# Patient Record
Sex: MALE | Race: WHITE | Employment: OTHER | URBAN - METROPOLITAN AREA
[De-identification: names, ages, dates, MRNs, and addresses within clinical notes are randomized per-mention and may not be internally consistent; named-entity substitution may affect disease eponyms.]

---

## 2017-01-09 ENCOUNTER — OFFICE VISIT (OUTPATIENT)
Dept: DERMATOLOGY | Facility: AMBULATORY SURGERY CENTER | Age: 62
End: 2017-01-09

## 2017-01-09 VITALS
TEMPERATURE: 98.4 F | WEIGHT: 240 LBS | RESPIRATION RATE: 18 BRPM | HEART RATE: 78 BPM | HEIGHT: 73 IN | BODY MASS INDEX: 31.81 KG/M2 | DIASTOLIC BLOOD PRESSURE: 84 MMHG | OXYGEN SATURATION: 97 % | SYSTOLIC BLOOD PRESSURE: 120 MMHG

## 2017-01-09 DIAGNOSIS — Z80.8 FAMILY HISTORY OF NONMELANOMA SKIN CANCER: ICD-10-CM

## 2017-01-09 DIAGNOSIS — L57.0 ACTINIC KERATOSIS: Primary | ICD-10-CM

## 2017-01-09 DIAGNOSIS — L82.0 INFLAMED SEBORRHEIC KERATOSIS: ICD-10-CM

## 2017-01-09 DIAGNOSIS — D18.01 CHERRY ANGIOMA: ICD-10-CM

## 2017-01-09 DIAGNOSIS — D22.9 MULTIPLE BENIGN NEVI: ICD-10-CM

## 2017-01-09 DIAGNOSIS — L82.1 SEBORRHEIC KERATOSES: ICD-10-CM

## 2017-01-09 NOTE — MR AVS SNAPSHOT
Visit Information Date & Time Provider Department Dept. Phone Encounter #  
 1/9/2017  9:00 AM MARIA LUZ Gallagher 8057 [phone] [id number] Your Appointments 1/9/2017  9:00 AM  
Office Visit with MARIA LUZ Gallagher 8057 [facility]) Appt Note: EST PT; 6 MO SKIN EXAM H/O ak  
 [facility] [address] [facility] [address]  
[address] [place] [address] [address] [place] [address] [address] [facility] [address] Upcoming Health Maintenance Date Due Hepatitis C Screening 1955 DTaP/Tdap/Td series (1 - Tdap) 3/23/1975 FOBT Q 1 YEAR AGE 50-75 3/23/2004 ZOSTER VACCINE AGE 60> 3/23/2014 INFLUENZA AGE 9 TO ADULT 8/1/2016 Allergies as of 1/9/2017  Review Complete On: 1/9/2017 By: Jason Palafox NP No Known Allergies Current Immunizations  Never Reviewed No immunizations on file. Not reviewed this visit Vitals BP Pulse Temp Resp Height(growth percentile) Weight(growth percentile) 120/84 (BP 1 Location: Right arm, BP Patient Position: Sitting) 78 98.4 °F (36.9 °C) (Oral) 18 6' 1\" (1.854 m) 240 lb (108.9 kg) SpO2 BMI Smoking Status 97% 31.66 kg/m2 Never Smoker Vitals History BMI and BSA Data Body Mass Index Body Surface Area  
 31.66 kg/m 2 2.37 m 2 Preferred Pharmacy Pharmacy Name Phone [facility] # [address], [address] [facility] [phone] Your Updated Medication List  
  
   
This list is accurate as of: 1/9/17  8:54 AM.  Always use your most recent med list.  
  
  
  
  
 aspirin delayed-release 81 mg tablet Take  by mouth daily. cholecalciferol (VITAMIN D3) 5,000 unit Tab tablet Commonly known as:  VITAMIN D3 Take  by mouth daily. COQ10  100-100 mg-unit Cap Generic drug:  coenzyme q10-vitamin e Take  by mouth.   
  
 CORAL CALCIUM  
 by Does Not Apply route. LANTUS SOLOSTAR 100 unit/mL (3 mL) pen Generic drug:  insulin glargine 60 Units by SubCUTAneous route. LIPITOR 20 mg tablet Generic drug:  atorvastatin Take 10 mg by mouth daily. * metFORMIN 500 mg tablet Commonly known as:  GLUCOPHAGE Take 1,500 mg by mouth two (2) times daily (with meals). * metFORMIN 1,000 mg tablet Commonly known as:  GLUCOPHAGE Take 1,000 mg by mouth nightly. multivitamin tablet Commonly known as:  ONE A DAY Take 1 Tab by mouth daily. Omega-3-DHA-EPA-Fish Oil 1,000 mg (120 mg-180 mg) Cap Take  by mouth. OTHER  
PQQ  
  
 * Notice: This list has 2 medication(s) that are the same as other medications prescribed for you. Read the directions carefully, and ask your doctor or other care provider to review them with you. Introducing [facility] HEALTH SERVICES! Noemy Brenner introduces [employer]here[employer] patient portal. Now you can access parts of your medical record, email your doctor's office, and request medication refills online. 1. In your internet browser, go to [email or web address] 2. Click on the First Time User? Click Here link in the Sign In box. You will see the New Member Sign Up page. 3. Enter your AdhereTech Access Code exactly as it appears below. You will not need to use this code after youve completed the sign-up process. If you do not sign up before the expiration date, you must request a new code. · Adhere[employer] Access Code: [email or web address] Expires: 4/9/2017  8:54 AM 
 
4. Enter the last four digits of your Social Security Number (xxxx) and Date of Birth (mm/dd/yyyy) as indicated and click Submit. You will be taken to the next sign-up page. 5. Create a HowStuffWorkst ID. This will be your AdhereTech login ID and cannot be changed, so think of one that is secure and easy to remember. 6. Create a AdhereTech password. You can change your password at any time. 7. Enter your Password Reset Question and Answer. This can be used at a later time if you forget your password. 8. Enter your e-mail address. You will receive e-mail notification when new information is available in [address] 9. Click Sign Up. You can now view and download portions of your medical record. 10. Click the Download Summary menu link to download a portable copy of your medical information. If you have questions, please visit the Frequently Asked Questions section of the [employer] website. Remember, [employer] is NOT to be used for urgent needs. For medical emergencies, dial 911. Now available from your iPhone and Android! Please provide this summary of care documentation to your next provider. If you have any questions after today's visit, please call [phone].

## 2017-01-09 NOTE — PROGRESS NOTES
Name: Azeem Ibanez       Age: 57 y.o. Date: 1/9/2017    Chief Complaint:   Chief Complaint   Patient presents with    Skin Exam       Subjective:    HPI  Mr. Azeem Ibanez is a 57 y.o. male who presents for a full skin exam.  The patient's last skin exam was 6 months ago and the patient does have current complaints related to his skin. He is aware of scaly lesions on the face and skin tag on the left ear. Mr. Azeem Ibanez is feeling well and in his usual state of health today. The patient's pertinent skin history includes : AK, Family history of melanoma in his father    ROS: Constitutional: Negative. Dermatological : positive for - skin lesion changes      Social History     Social History    Marital status:      Spouse name: N/A    Number of children: N/A    Years of education: N/A     Occupational History    Not on file. Social History Main Topics    Smoking status: Never Smoker    Smokeless tobacco: Never Used    Alcohol use No    Drug use: No    Sexual activity: Not on file     Other Topics Concern    Not on file     Social History Narrative       Family History   Problem Relation Age of Onset    Cancer Mother     Emphysema Mother     Cancer Father        Past Medical History   Diagnosis Date    Endocrine disease      Diabetes    Family history of skin cancer      Melanoma in his father    History of actinic keratoses     Sun-damaged skin     Sunburn, blistering        Past Surgical History   Procedure Laterality Date    Hx hip replacement       Left side       Current Outpatient Prescriptions   Medication Sig Dispense Refill    OTHER PQQ      multivitamin (ONE A DAY) tablet Take 1 Tab by mouth daily.  Omega-3-DHA-EPA-Fish Oil 1,000 mg (120 mg-180 mg) cap Take  by mouth.  atorvastatin (LIPITOR) 20 mg tablet Take 10 mg by mouth daily.  aspirin delayed-release 81 mg tablet Take  by mouth daily.       metFORMIN (GLUCOPHAGE) 500 mg tablet Take 1,500 mg by mouth two (2) times daily (with meals).  coenzyme q10-vitamin e (COQ10 ) 100-100 mg-unit cap Take  by mouth.  cholecalciferol, VITAMIN D3, (VITAMIN D3) 5,000 unit tab tablet Take  by mouth daily.  insulin glargine (LANTUS SOLOSTAR) 100 unit/mL (3 mL) pen 60 Units by SubCUTAneous route.  metFORMIN (GLUCOPHAGE) 1,000 mg tablet Take 1,000 mg by mouth nightly.  CALCIUM CARBONATE (CORAL CALCIUM) by Does Not Apply route. No Known Allergies      Objective:    Visit Vitals    /84 (BP 1 Location: Right arm, BP Patient Position: Sitting)    Pulse 78    Temp 98.4 °F (36.9 °C) (Oral)    Resp 18    Ht 6' 1\" (1.854 m)    Wt 108.9 kg (240 lb)    SpO2 97%    BMI 31.66 kg/m2       Elva Thompson is a 57 y.o. male who appears well and in no distress. He is awake, alert, and oriented. There is no preauricular, submandibular, or cervical lymphadenopathy. A skin examination was performed including his scalp, face (including eyelids), ears, neck, chest, back, abdomen, upper extremities (including digits/nails), lower extremities, breasts, buttocks; genital skin was not examined. There are thin scaled AKs on the scalp, face. There are two small skin tags on the left ear (one on the superior aspect and one on the posterior). There are scattered seborrheic keratoses and cherry angiomas. Two inflamed SKs are noted on the face. There are medium brown junctional nevi and pink intradermal nevi without concerning features. I did image one on the left chest that is medium brown broken up pigment but reassuring otherwise. Assessment/Plan:    1. Normal nevi. The diagnosis of normal nevi was reviewed. I discussed sun protection, sunscreen use, the warning signs of skin cancer, the need for self-skin examinations, and the need for regular practitioner exams every 9 months.   The patient should follow up sooner as needed if new, changing, or symptomatic skin lesions arise.  2. Seborrheic keratoses. The diagnosis was reviewed and the patient was reassured that no treatment is needed for these benign lesions. 3. Family history of skin cancer. I discussed sun protection, sunscreen use, the warning signs of skin cancer, the need for self-skin examinations, and the need for regular practitioner exams every 9 months. The patient should follow up sooner as needed if new, changing, or symptomatic skin lesions arise. 4. Cherry angiomas. The diagnosis was reviewed and the patient was reassured that no treatment is needed for these benign lesions. 5. Inflamed seborrheic keratoses. The diagnosis and treatment with liquid nitrogen cryotherapy were reviewed. The risk or persistence or recurrence of the keratosis and the potential for pigment change at the treated site were reviewed. Verbal consent was obtained. I treated 2 lesions with cryotherapy and care was reviewed. 6. Actinic Keratoses. The diagnosis of this precancerous lesion related to sun exposure was reviewed. Verbal consent was obtained. I treated 6 lesions with cryotherapy and post-cryotherapy care was reviewed. [facility]   OFFICE PROCEDURE PROGRESS NOTE   Chart reviewed for the following:   Saeid ESPINAL, [address], have reviewed the History, Physical and updated the Allergic reactions for [place]. TIME OUT performed immediately prior to start of procedure:   Kylie ESPINAL, have performed the following reviews on [place]   prior to the start of the procedure:     * Patient was identified by name and date of birth   * Agreement on procedure being performed was verified   * Risks and Benefits explained to the patient   * Procedure site verified and marked as necessary   * Patient was positioned for comfort   * Verbal consent was obtained    Time: 0915  Date of procedure: 1/9/2017  Procedure performed by: Reny Busby.  Abhi Louis  Provider assisted by: none   Patient assisted by: self   How tolerated by patient: tolerated the procedure well with no complications   Comments: none

## 2017-10-09 ENCOUNTER — OFFICE VISIT (OUTPATIENT)
Dept: DERMATOLOGY | Facility: AMBULATORY SURGERY CENTER | Age: 62
End: 2017-10-09

## 2017-10-09 VITALS
BODY MASS INDEX: 31.81 KG/M2 | TEMPERATURE: 98.3 F | HEIGHT: 73 IN | SYSTOLIC BLOOD PRESSURE: 140 MMHG | DIASTOLIC BLOOD PRESSURE: 60 MMHG | HEART RATE: 91 BPM | WEIGHT: 240 LBS | RESPIRATION RATE: 18 BRPM | OXYGEN SATURATION: 97 %

## 2017-10-09 DIAGNOSIS — L82.1 OTHER SEBORRHEIC KERATOSIS: ICD-10-CM

## 2017-10-09 DIAGNOSIS — D69.2 PIGMENTED PURPURA (HCC): ICD-10-CM

## 2017-10-09 DIAGNOSIS — L57.0 ACTINIC KERATOSIS: Primary | ICD-10-CM

## 2017-10-09 DIAGNOSIS — L90.5 SCAR CONDITION AND FIBROSIS OF SKIN: ICD-10-CM

## 2017-10-09 DIAGNOSIS — T14.8XXA EXCORIATION: ICD-10-CM

## 2017-10-09 DIAGNOSIS — L81.4 LENTIGINES: ICD-10-CM

## 2017-10-09 DIAGNOSIS — Z80.8 FAMILY HISTORY OF MELANOMA: ICD-10-CM

## 2017-10-09 DIAGNOSIS — D22.9 MULTIPLE BENIGN NEVI: ICD-10-CM

## 2017-10-09 DIAGNOSIS — D18.01 CHERRY ANGIOMA: ICD-10-CM

## 2017-10-09 DIAGNOSIS — L82.0 INFLAMED SEBORRHEIC KERATOSIS: ICD-10-CM

## 2017-10-09 NOTE — PROGRESS NOTES
Chief Complaint   Patient presents with    Skin Exam     1. Have you been to the ER, urgent care clinic since your last visit? Hospitalized since your last visit? No    2. Have you seen or consulted any other health care providers outside of the [address] [place] [address] since your last visit? Include any pap smears or colon screening. Yes, Dr Pastor Vizcaino.

## 2017-10-09 NOTE — PROGRESS NOTES
Written by Dariana Agosto, as dictated by Leila Goddard, [address]. Name: Chuyita Guerrero       Age: 60 y.o. Date: 10/9/2017    Chief Complaint:   Chief Complaint   Patient presents with    Skin Exam       Subjective:    HPI  Mr. Chuyita Guerrero is a 60 y.o. male who presents for a full skin exam.  The patient's last skin exam was on 1/9/17 and the patient does have current complaints related to his skin. He reports crusty lesions on his forehead to scalp. He hits these with his comb. The patient's pertinent skin history includes : AKs; family history of melanoma skin cancer in his father     ROS: Constitutional: Negative. Dermatological : positive for - skin lesion changes      Social History     Social History    Marital status:      Spouse name: N/A    Number of children: N/A    Years of education: N/A     Occupational History    Not on file. Social History Main Topics    Smoking status: Never Smoker    Smokeless tobacco: Never Used    Alcohol use No    Drug use: No    Sexual activity: Not on file     Other Topics Concern    Not on file     Social History Narrative       Family History   Problem Relation Age of Onset    Cancer Mother     Emphysema Mother     Cancer Father        Past Medical History:   Diagnosis Date    Endocrine disease     Diabetes    Family history of skin cancer     Melanoma in his father    History of actinic keratoses     Sun-damaged skin     Sunburn, blistering        Past Surgical History:   Procedure Laterality Date    HX HIP REPLACEMENT      Left side       Current Outpatient Prescriptions   Medication Sig Dispense Refill    OTHER PQQ      multivitamin (ONE A DAY) tablet Take 1 Tab by mouth daily.  Omega-3-DHA-EPA-Fish Oil 1,000 mg (120 mg-180 mg) cap Take  by mouth.  atorvastatin (LIPITOR) 20 mg tablet Take 10 mg by mouth daily.  aspirin delayed-release 81 mg tablet Take  by mouth daily.       metFORMIN (GLUCOPHAGE) 500 mg tablet Take 1,500 mg by mouth two (2) times daily (with meals).  metFORMIN (GLUCOPHAGE) 1,000 mg tablet Take 1,000 mg by mouth nightly.  CALCIUM CARBONATE (CORAL CALCIUM) by Does Not Apply route.  coenzyme q10-vitamin e (COQ10 ) 100-100 mg-unit cap Take  by mouth.  cholecalciferol, VITAMIN D3, (VITAMIN D3) 5,000 unit tab tablet Take  by mouth daily.  insulin glargine (LANTUS SOLOSTAR) 100 unit/mL (3 mL) pen 60 Units by SubCUTAneous route. No Known Allergies      Objective:    Visit Vitals    /60 (BP 1 Location: Left arm, BP Patient Position: Sitting)    Pulse 91    Temp 98.3 °F (36.8 °C) (Oral)    Resp 18    Ht 6' 1\" (1.854 m)    Wt 108.9 kg (240 lb)    SpO2 97%    BMI 31.66 kg/m2       Remi Mix is a 60 y.o. male who appears well and in no distress. He is awake, alert, and oriented. There is no preauricular, submandibular, or cervical lymphadenopathy. A skin examination was performed including his scalp, face (including eyelids), ears, neck, chest, back, abdomen, upper extremities (including digits/nails), lower extremities, breasts, buttocks; genital skin was not examined. He has a well healed scar on his upper back and two on the mid/ lower back. He has pink intradermal nevi and brown junctional nevi, no concerning features. He has scattered waxy macules and keratotic papules consistent with seborrheic keratoses. He has inflamed seborrheic keratoses at his temporal hairline on the left. He has scattered red papules consistent with cherry angiomas. He has lentigines on sun exposed areas. He has a 9 x 6 mm medium brown pigmented and irregularly shaped macule, consistent with junctional nevus on his mid chest, no apparent change from prior photo. He has a scaly lesion consistent with an actinic keratosis on his right cheek. Assessment/Plan:    1. Normal nevi. The diagnosis of normal nevi was reviewed.   I discussed sun protection, sunscreen use, the warning signs of skin cancer, the need for self-skin examinations, and the need for regular practitioner exams every 9 months. The patient should follow up sooner as needed if new, changing, or symptomatic skin lesions arise. 2. Seborrheic keratoses. The diagnosis was reviewed and the patient was reassured that no treatment is needed for these benign lesions. 3. Inflamed seborrheic keratoses. The diagnosis was reviewed. Verbal consent was obtained. I treated 5 lesions with cryotherapy and post-cryotherapy care was reviewed. 4. Cherry angiomas. The diagnosis was reviewed and the patient was reassured that no treatment is needed for these benign lesions. 5. Solar lentigos. The diagnosis and relationship to sun exposure was reviewed. Sun protection advised. 6. Neoplasm of Uncertain Behavior, mid chest.  The differential diagnoses were discussed. We will continue to monitor this neoplasm of uncertain behavior on his chest for changes. 7. Actinic Keratosis, right cheek. The diagnosis of this precancerous lesion related to sun exposure was reviewed. Verbal consent was obtained. I treated 1 lesion with cryotherapy and post-cryotherapy care was reviewed. 8. Family history of skin cancer. I discussed sun protection, sunscreen use, the warning signs of skin cancer, the need for self-skin examinations, and the need for regular practitioner exams every 9 months. The patient should follow up sooner as needed if new, changing, or symptomatic skin lesions arise. This plan was reviewed with the patient and patient agrees. All questions were answered. This scribe documentation was reviewed by me and accurately reflects the examination and decisions made by me. [facility]   OFFICE PROCEDURE PROGRESS NOTE   Chart reviewed for the following:   I, Saeid Armendariz, have reviewed the History, Physical and updated the Allergic reactions for Esteban Trace. TIME OUT performed immediately prior to start of procedure:   Kylie ESPINAL, have performed the following reviews on Esteban Trace   prior to the start of the procedure:     * Patient was identified by name and date of birth   * Agreement on procedure being performed was verified   * Risks and Benefits explained to the patient   * Procedure site verified and marked as necessary   * Patient was positioned for comfort   * Verbal consent was obtained    Time: 9:22 AM  Date of procedure: 10/9/2017  Procedure performed by: Angelito Mcqueen.  Americo Reese DNP  Provider assisted by: self  Patient assisted by: self   How tolerated by patient: tolerated the procedure well with no complications   Comments: none

## 2017-10-09 NOTE — MR AVS SNAPSHOT
Visit Information Date & Time Provider Department Dept. Phone Encounter #  
 10/9/2017  9:00 AM Juwan PengMARIA LUZ 8057 [phone] [id number] Upcoming Health Maintenance Date Due Hepatitis C Screening 1955 DTaP/Tdap/Td series (1 - Tdap) 3/23/1975 FOBT Q 1 YEAR AGE 50-75 3/23/2004 ZOSTER VACCINE AGE 60> 1/23/2014 INFLUENZA AGE 9 TO ADULT 8/1/2017 Allergies as of 10/9/2017  Review Complete On: 10/9/2017 By: Helen Castro No Known Allergies Current Immunizations  Never Reviewed No immunizations on file. Not reviewed this visit Vitals BP Pulse Temp Resp Height(growth percentile) Weight(growth percentile) 140/60 (BP 1 Location: Left arm, BP Patient Position: Sitting) 91 98.3 °F (36.8 °C) (Oral) 18 6' 1\" (1.854 m) 240 lb (108.9 kg) SpO2 BMI Smoking Status 97% 31.66 kg/m2 Never Smoker BMI and BSA Data Body Mass Index Body Surface Area  
 31.66 kg/m 2 2.37 m 2 Preferred Pharmacy Pharmacy Name Phone [place] [phone] Your Updated Medication List  
  
   
This list is accurate as of: 10/9/17  9:02 AM.  Always use your most recent med list.  
  
  
  
  
 aspirin delayed-release 81 mg tablet Take  by mouth daily. cholecalciferol (VITAMIN D3) 5,000 unit Tab tablet Commonly known as:  VITAMIN D3 Take  by mouth daily. COQ10  100-100 mg-unit Cap Generic drug:  coenzyme q10-vitamin e Take  by mouth. CORAL CALCIUM  
by Does Not Apply route. LANTUS SOLOSTAR 100 unit/mL (3 mL) Inpn Generic drug:  insulin glargine 60 Units by SubCUTAneous route. LIPITOR 20 mg tablet Generic drug:  atorvastatin Take 10 mg by mouth daily. * metFORMIN 500 mg tablet Commonly known as:  GLUCOPHAGE Take 1,500 mg by mouth two (2) times daily (with meals). * metFORMIN 1,000 mg tablet Commonly known as:  GLUCOPHAGE Take 1,000 mg by mouth nightly. multivitamin tablet Commonly known as:  ONE A DAY Take 1 Tab by mouth daily. Omega-3-DHA-EPA-Fish Oil 1,000 mg (120 mg-180 mg) Cap Take  by mouth. OTHER  
PQQ  
  
 * Notice: This list has 2 medication(s) that are the same as other medications prescribed for you. Read the directions carefully, and ask your doctor or other care provider to review them with you. Patient Instructions Self Skin Exam and Sunscreens Early detection and treatment is essential in the treatment of all forms of skin cancer. If caught early, all forms of skin cancer are curable. In addition to your regular visits, you should perform a monthly skin examination. Over time, you become familiar with what is normally found on your skin and can identify new or suspicious spots. One of the screening tools you can use to assess your skin is to follow the ABCDEs: 
 
A= Asymmetry (One half is unlike the other half) B= Border (An irregular, scalloped or poorly defined edge) C= Color (Is varied from one area to another, has shades of tan, brown/ black,       white, red or blue) D= Diameter (Spots larger than 6mm or a pencil eraser) E= Evolving (New spots or one that is changing in size, shape, or color) A follow- up interval will be customized based on your history of skin cancer or level of skin damage and risk factors. In any case, if you notice a suspicious or new spot, an appointment should be arranged between regular visits. Everyone should use sunscreen and sun-safe practices, which is especially important for those with a personal or family history of skin cancer. Suggestions for this include: 1. Use daily moisturizers containing SPF 30 or higher. 2. Wear long sleeve clothing with UPF ratings and a broad-brimmed hat. 3. Apply sunscreen with SPF 30 or higher to all sun exposed areas if you are going to be in the sun. A broad spectrum UVA/ UVB sunscreen is best.  Dont forget to REAPPLY every two hours or more often if swimming or sweating! 4. Avoid outside activities during peak sun hours, especially in the summer (10am- 2pm). 5. DO NOT use tanning beds. Using sunscreen and sun-safe practices can help reduce the likelihood of developing skin cancer or additional skin cancers in those previously diagnosed. Introducing [facility] & [facility]! Romayne Duster introduces [employer] patient portal. Now you can access parts of your medical record, email your doctor's office, and request medication refills online. 1. In your internet browser, go to [email or web address] 2. Click on the First Time User? Click Here link in the Sign In box. You will see the New Member Sign Up page. 3. Enter your Netsize Access Code exactly as it appears below. You will not need to use this code after youve completed the sign-up process. If you do not sign up before the expiration date, you must request a new code. · Nets[employer] Access Code: [email or web address] Expires: 1/7/2018  9:02 AM 
 
4. Enter the last four digits of your Social Security Number (xxxx) and Date of Birth (mm/dd/yyyy) as indicated and click Submit. You will be taken to the next sign-up page. 5. Create a Netsize ID. This will be your Netsize login ID and cannot be changed, so think of one that is secure and easy to remember. 6. Create a Netsize password. You can change your password at any time. 7. Enter your Password Reset Question and Answer. This can be used at a later time if you forget your password. 8. Enter your e-mail address. You will receive e-mail notification when new information is available in [address] 9. Click Sign Up. You can now view and download portions of your medical record. 10. Click the Download Summary menu link to download a portable copy of your medical information. If you have questions, please visit the Frequently Asked Questions section of the [employer] website. Remember, [employer] is NOT to be used for urgent needs. For medical emergencies, dial 911. Now available from your iPhone and Android! Please provide this summary of care documentation to your next provider. Your primary care clinician is listed as Ladonna Holguin. If you have any questions after today's visit, please call [phone].

## 2017-10-25 ENCOUNTER — HOSPITAL ENCOUNTER (OUTPATIENT)
Dept: CT IMAGING | Age: 62
Discharge: HOME | End: 2017-10-25
Payer: SELF-PAY

## 2017-10-25 DIAGNOSIS — Z00.00 PREVENTATIVE HEALTH CARE: ICD-10-CM

## 2017-10-25 PROCEDURE — 75571 CT HRT W/O DYE W/CA TEST: CPT

## 2017-10-25 NOTE — CARDIO/PULMONARY
Cardiac Rehab: Chart reviewed due to pt scheduled for cardio CT scan at 1130 today. No results in [facility]. UPDATE 10/27/2017: Spoke with patient about Calcium Scoring results (151). Discussed significance of results, and CAD risk factors. Pt reported that he was being screened because his wife was having it done. He is on meds for high cholesterol and DM. Reported he also takes a \"blood pressure medicine\" for his diabetes. Denied smoking. Discussed importance of glucose control, heart healthy/low sodium diet and exercise. 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 Encouraged daily walking program & explained target HR. Pt indicated he would continue to f/u with PCP. Copy of report sent to patient, with handout on learning about CAD.

## 2018-07-02 ENCOUNTER — OFFICE VISIT (OUTPATIENT)
Dept: DERMATOLOGY | Facility: AMBULATORY SURGERY CENTER | Age: 63
End: 2018-07-02

## 2018-07-02 VITALS
RESPIRATION RATE: 16 BRPM | SYSTOLIC BLOOD PRESSURE: 142 MMHG | TEMPERATURE: 97.8 F | OXYGEN SATURATION: 97 % | HEART RATE: 84 BPM | DIASTOLIC BLOOD PRESSURE: 64 MMHG

## 2018-07-02 DIAGNOSIS — Z80.8 FAMILY HISTORY OF SKIN CANCER: ICD-10-CM

## 2018-07-02 DIAGNOSIS — L81.4 LENTIGINES: ICD-10-CM

## 2018-07-02 DIAGNOSIS — D48.5 NEOPLASM OF UNCERTAIN BEHAVIOR OF SKIN OF CHEST: ICD-10-CM

## 2018-07-02 DIAGNOSIS — D18.01 CHERRY ANGIOMA: ICD-10-CM

## 2018-07-02 DIAGNOSIS — L57.0 ACTINIC KERATOSIS: Primary | ICD-10-CM

## 2018-07-02 DIAGNOSIS — D22.9 MULTIPLE BENIGN NEVI: ICD-10-CM

## 2018-07-02 DIAGNOSIS — L82.1 OTHER SEBORRHEIC KERATOSIS: ICD-10-CM

## 2018-07-02 DIAGNOSIS — D23.9 DERMATOFIBROMA: ICD-10-CM

## 2018-07-02 DIAGNOSIS — L82.0 INFLAMED SEBORRHEIC KERATOSIS: ICD-10-CM

## 2018-07-02 NOTE — PROGRESS NOTES
Written by Ellen Grimm, as dictated by Chaya Johnson, [address]. Name: Mayte Mackay       Age: 58 y.o. Date: 7/2/2018    Chief Complaint:   Chief Complaint   Patient presents with    Skin Exam       Subjective:    HPI  Mr. Mayte Mackay is a 58 y.o. male who presents for a full skin exam.  The patient's last skin exam was on 10/9/17 and the patient does have current complaints related to his skin. He has new lesions of concern on his scalp. He is aware of the irregular spot on his chest but does not have symptoms and is not aware of significant change. The patient's pertinent skin history includes : AKs; family history of melanoma skin cancer in his father     ROS: Constitutional: Negative. Dermatological : positive for - skin lesion changes    Social History     Social History    Marital status:      Spouse name: N/A    Number of children: N/A    Years of education: N/A     Occupational History    Not on file. Social History Main Topics    Smoking status: Never Smoker    Smokeless tobacco: Never Used    Alcohol use No    Drug use: No    Sexual activity: Not on file     Other Topics Concern    Not on file     Social History Narrative       Family History   Problem Relation Age of Onset    Cancer Mother     Emphysema Mother     Cancer Father        Past Medical History:   Diagnosis Date    Endocrine disease     Diabetes    Family history of skin cancer     Melanoma in his father    History of actinic keratoses     Sun-damaged skin     Sunburn, blistering        Past Surgical History:   Procedure Laterality Date    HX HIP REPLACEMENT      Left side       Current Outpatient Prescriptions   Medication Sig Dispense Refill    brompheniramin/pseudoephedrine (BROMALINE PO) Take  by mouth.  OTHER PQQ      multivitamin (ONE A DAY) tablet Take 1 Tab by mouth daily.  Omega-3-DHA-EPA-Fish Oil 1,000 mg (120 mg-180 mg) cap Take  by mouth.       atorvastatin (LIPITOR) 20 mg tablet Take 10 mg by mouth daily.  aspirin delayed-release 81 mg tablet Take  by mouth daily.  metFORMIN (GLUCOPHAGE) 500 mg tablet Take 1,500 mg by mouth two (2) times daily (with meals).  metFORMIN (GLUCOPHAGE) 1,000 mg tablet Take 1,000 mg by mouth nightly.  CALCIUM CARBONATE (CORAL CALCIUM) by Does Not Apply route.  coenzyme q10-vitamin e (COQ10 ) 100-100 mg-unit cap Take  by mouth.  cholecalciferol, VITAMIN D3, (VITAMIN D3) 5,000 unit tab tablet Take  by mouth daily.  insulin glargine (LANTUS SOLOSTAR) 100 unit/mL (3 mL) pen 60 Units by SubCUTAneous route. No Known Allergies      Objective:    Visit Vitals    /64    Pulse 84    Temp 97.8 °F (36.6 °C)    Resp 16    SpO2 97%       Jayde De Los Santos is a 58 y.o. male who appears well and in no distress. He is awake, alert, and oriented. There is no preauricular, submandibular, or cervical lymphadenopathy. A skin examination was performed including his scalp, face (including eyelids), ears, neck, chest, back, abdomen, upper extremities (including digits/nails), lower extremities, breasts. He has pink intradermal nevi and brown junctional nevi. There are scattered waxy macules and keratotic papules consistent with seborrheic keratoses - one inflamed on the left forehead. He has scattered red papules consistent with cherry angiomas. He has lentigines on sun exposed areas. He has firm pink papules consistent with dermatofibromas. He has a 10 x 6 mm irregularly pigmented macule of medium brown color with slow change from his last skin exam on his left chest. He has thin scaled AKs - one on the right forehead and one on the left temple. Assessment/Plan:    1. Family history of skin cancer. I discussed sun protection, sunscreen use, the warning signs of skin cancer, the need for self-skin examinations, and the need for regular practitioner exams every year.   The patient should follow up sooner as needed if new, changing, or symptomatic skin lesions arise. 2. Normal nevi. The diagnosis of normal nevi was reviewed. I discussed sun protection, sunscreen use, the warning signs of skin cancer, the need for self-skin examinations, and the need for regular practitioner exams every year. The patient should follow up sooner as needed if new, changing, or symptomatic skin lesions arise. 3. Seborrheic keratoses. The diagnosis was reviewed and the patient was reassured that no treatment is needed for these benign lesions. 4. Cherry angiomas. The diagnosis was reviewed and the patient was reassured that no treatment is needed for these benign lesions. 5. Solar lentigos. The diagnosis and relationship to sun exposure was reviewed. Sun protection advised. 6. Dermatofibroma. The diagnosis was reviewed and the patient was reassured that no treatment is needed for these benign conditions at this time. The patient should follow up should the lesion change or become symptomatic. 7. Actinic Keratoses. The diagnosis of this precancerous lesion related to sun exposure was reviewed. Verbal consent was obtained. I treated 3 lesions with cryotherapy and post-cryotherapy care was reviewed. 8. Neoplasm of Uncertain Behavior, left chest.  The differential diagnoses were discussed. A shave removal was advised to address this lesion. The procedure was reviewed and verbal and written consent were obtained. The risks of pain, bleeding, infection, recurrence and scar were discussed. I performed the procedure. The site was cleansed and anesthetized with 1% Lidocaine with Epinephrine 1:100,000. A shave removal was performed to remove the lesion in its clinical entirety. Drysol was used for hemostasis. The wound was bandaged and care reviewed. The specimen was sent to pathology.   I will contact the patient with the results and any further treatment that may be necessary. 9. Inflamed seborrheic keratoses. The diagnosis and treatment with liquid nitrogen cryotherapy were reviewed. The risk or persistence or recurrence of the keratosis and the potential for pigment change at the treated site were reviewed. Verbal consent was obtained. I treated 1 lesions with cryotherapy and care was reviewed. This plan was reviewed with the patient and patient agrees. All questions were answered. This scribe documentation was reviewed by me and accurately reflects the examination and decisions made by me. [facility]   OFFICE PROCEDURE PROGRESS NOTE   Chart reviewed for the following:   ISaeid Plan, [address], have reviewed the History, Physical and updated the Allergic reactions for [facility]. TIME OUT performed immediately prior to start of procedure:   IKylie, have performed the following reviews on [facility]   prior to the start of the procedure:     * Patient was identified by name and date of birth   * Agreement on procedure being performed was verified   * Risks and Benefits explained to the patient   * Procedure site verified and marked as necessary   * Patient was positioned for comfort   * Consent was signed and verified     Time: 9:10 AM  Date of procedure: 7/2/2018  Procedure performed by: Bobbi Linares.  Ernie Delatorre  Provider assisted by: Darien Camacho LPN   Patient assisted by: self   How tolerated by patient: tolerated the procedure well with no complications   Comments: none

## 2018-07-02 NOTE — MR AVS SNAPSHOT
[address] [facility] [address] [place] [address] [address] 
[phone] Patient: Chuyita Guerrero MRN: [id number] BIU:[id number] Visit Information Date & Time Provider Department Dept. Phone Encounter #  
 7/2/2018  9:00 AM MARIA LUZ [id number] [phone] Your Appointments 7/2/2018  9:00 AM  
Office Visit with MARIA LUZ [id number] [place] [address]) Appt Note: 9 month skin exam  
 [facility] [address] [facility] [address]  
[address] [facility] [address] [facility] [address] Upcoming Health Maintenance Date Due Hepatitis C Screening 1955 DTaP/Tdap/Td series (1 - Tdap) 3/23/1975 FOBT Q 1 YEAR AGE 50-75 3/23/2004 ZOSTER VACCINE AGE 60> 1/23/2014 Influenza Age 5 to Adult 8/1/2018 Allergies as of 7/2/2018  Review Complete On: 7/2/2018 By: Debbie Mahajan LPN No Known Allergies Current Immunizations  Never Reviewed No immunizations on file. Not reviewed this visit Vitals Smoking Status Never Smoker Preferred Pharmacy Pharmacy Name Phone [facility] [phone] Your Updated Medication List  
  
   
This list is accurate as of 7/2/18  8:40 AM.  Always use your most recent med list.  
  
  
  
  
 aspirin delayed-release 81 mg tablet Take  by mouth daily. BROMALINE PO Take  by mouth. cholecalciferol (VITAMIN D3) 5,000 unit Tab tablet Commonly known as:  VITAMIN D3 Take  by mouth daily. COQ10  100-100 mg-unit Cap Generic drug:  coenzyme q10-vitamin e Take  by mouth. CORAL CALCIUM  
by Does Not Apply route. LANTUS SOLOSTAR U-100 INSULIN 100 unit/mL (3 mL) Inpn Generic drug:  insulin glargine 60 Units by SubCUTAneous route. LIPITOR 20 mg tablet Generic drug:  atorvastatin Take 10 mg by mouth daily. * metFORMIN 500 mg tablet Commonly known as:  GLUCOPHAGE Take 1,500 mg by mouth two (2) times daily (with meals). * metFORMIN 1,000 mg tablet Commonly known as:  GLUCOPHAGE Take 1,000 mg by mouth nightly. multivitamin tablet Commonly known as:  ONE A DAY Take 1 Tab by mouth daily. omega 3-DHA-EPA-fish oil 1,000 mg (120 mg-180 mg) capsule Take  by mouth. OTHER  
PQQ  
  
 * Notice: This list has 2 medication(s) that are the same as other medications prescribed for you. Read the directions carefully, and ask your doctor or other care provider to review them with you. Patient Instructions Self Skin Exam and Sunscreens Early detection and treatment is essential in the treatment of all forms of skin cancer. If caught early, all forms of skin cancer are curable. In addition to your regular visits, you should perform a monthly skin examination. Over time, you become familiar with what is normally found on your skin and can identify new or suspicious spots. One of the screening tools you can use to assess your skin is to follow the ABCDEs: 
 
A= Asymmetry (One half is unlike the other half) B= Border (An irregular, scalloped or poorly defined edge) C= Color (Is varied from one area to another, has shades of tan, brown/ black,       white, red or blue) D= Diameter (Spots larger than 6mm or a pencil eraser) E= Evolving (New spots or one that is changing in size, shape, or color) A follow- up interval will be customized based on your history of skin cancer or level of skin damage and risk factors. In any case, if you notice a suspicious or new spot, an appointment should be arranged between regular visits.  
 
Everyone should use sunscreen and sun-safe practices, which is especially important for those with a personal or family history of skin cancer. Suggestions for this include: 1. Use daily moisturizers containing SPF 30 or higher. 2. Wear long sleeve clothing with UPF ratings and a broad-brimmed hat. 3. Apply sunscreen with SPF 30 or higher to all sun exposed areas if you are going to be in the sun. A broad spectrum UVA/ UVB sunscreen is best.  Dont forget to REAPPLY every two hours or more often if swimming or sweating! 4. Avoid outside activities during peak sun hours, especially in the summer (10am- 2pm). 5. DO NOT use tanning beds. Using sunscreen and sun-safe practices can help reduce the likelihood of developing skin cancer or additional skin cancers in those previously diagnosed. Introducing [facility]! [employer] Zazueta introduces [employer] patient portal. Now you can access parts of your medical record, email your doctor's office, and request medication refills online. 1. In your internet browser, go to [email or web address] 2. Click on the First Time User? Click Here link in the Sign In box. You will see the New Member Sign Up page. 3. Enter your [employer] Access Code exactly as it appears below. You will not need to use this code after youve completed the sign-up process. If you do not sign up before the expiration date, you must request a new code. · [employer] Access Code: [email or web address] Expires: 9/30/2018  8:40 AM 
 
4. Enter the last four digits of your Social Security Number (xxxx) and Date of Birth (mm/dd/yyyy) as indicated and click Submit. You will be taken to the next sign-up page. 5. Create a [employer] ID. This will be your [employer] login ID and cannot be changed, so think of one that is secure and easy to remember. 6. Create a [employer] password. You can change your password at any time. 7. Enter your Password Reset Question and Answer. This can be used at a later time if you forget your password. 8. Enter your e-mail address. You will receive e-mail notification when new information is available in [address] 9. Click Sign Up. You can now view and download portions of your medical record. 10. Click the Download Summary menu link to download a portable copy of your medical information. If you have questions, please visit the Frequently Asked Questions section of the [employer] website. Remember, [employer] is NOT to be used for urgent needs. For medical emergencies, dial 911. Now available from your iPhone and Android! Please provide this summary of care documentation to your next provider. Your primary care clinician is listed as Cindy Granados. If you have any questions after today's visit, please call [phone].

## 2018-07-03 ENCOUNTER — HOSPITAL ENCOUNTER (OUTPATIENT)
Dept: LAB | Age: 63
Discharge: HOME | End: 2018-07-03

## 2018-07-05 NOTE — PROGRESS NOTES
I spoke with the patient and he is aware of the diagnosis of MMi and need for excision. He states the biopsy site is doing well. He will return on 7/23 for excision with Dr. Warden Landry.

## 2018-07-23 ENCOUNTER — OFFICE VISIT (OUTPATIENT)
Dept: DERMATOLOGY | Facility: AMBULATORY SURGERY CENTER | Age: 63
End: 2018-07-23

## 2018-07-23 ENCOUNTER — HOSPITAL ENCOUNTER (OUTPATIENT)
Dept: LAB | Age: 63
Discharge: HOME | End: 2018-07-23

## 2018-07-23 DIAGNOSIS — D03.59 MALIGNANT MELANOMA IN SITU OF SKIN OF ANTERIOR CHEST (HCC): Primary | ICD-10-CM

## 2018-07-23 NOTE — PROGRESS NOTES
This note is written by Nick Lawrence, as dictated by Chip Sanchez. Mau Forrester MD.    CC: Melanoma in situ on the left chest    History of present illness:     Hellen Hannon is a 58 y.o. male referred by Felix Dia DNP. 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 This is a new melanoma in situ present for an unknown amount of time described as a nevus that Felix Dia DNP noted had changed with no prior treatment. Biopsy confirmed the diagnosis of melanoma in situ, and I reviewed the written pathology report. He is feeling well and in his usual state of health today. He has no pain, no current illnesses, no other skin concerns. His allergies, medications, medical, and social history are reviewed by me today. Exam:     He is an awake, alert, and oriented 58 y.o. male who appears well and in no distress. There is no preauricular, submandibular, or cervical lymphadenopathy. I examined his left chest. He has a 14 x 10 mm pink macule with no residual pigmentation on his left chest. He confirms location. Assessment/plan:    1. Melanoma in situ, left chest. I discussed the diagnosis of melanoma in situ and summarized the pathology report. Excision was advised for removal and therapy of this 14 x 10 mm lesion on left chest. The excision procedure was reviewed and verbal and written consent were obtained. The risks of pain, bleeding, infection, recurrence of the lesion, and scar were discussed. I performed the procedure. The site was cleansed and anesthetized with 1% lidocaine with epinephrine 1:100,000. The lesion was excised with a 5 mm margin in an elliptical manner to a depth of the subcutaneous tissue. An intermediate repair was performed after the excision. 4-0 polysorb suture was used for approximation of deep tissues and a second layer of 4-0 polysorb was used to approximate the skin edges. The closure length was 41 mm.   The wound was bandaged with Steristrips and care reviewed. The specimen was sent to pathology. I will contact the patient with the results and any further treatment that may be necessary. 2. History of melanoma skin cancer. I discussed the diagnosis and recommend routine examinations with Madeline Harmon DNP for surveillance. The documentation recorded by the scribe accurately reflects the service I personally performed and the decisions made by me. [facility]   OFFICE PROCEDURE PROGRESS NOTE     Chart reviewed for the following:     Phoebe Salguero MD, have reviewed the History, Physical and updated the Allergic reactions for [address] performed immediately prior to start of procedure:     Phoebe Salguero MD, have performed the following reviews on [facility] prior to the start of the procedure:     * Patient was identified by name and date of birth   * Agreement on procedure being performed was verified   * Risks and Benefits explained to the patient   * Procedure site verified and marked as necessary   * Patient was positioned for comfort   * Consent was signed and verified     Time: 3:20 PM  Date of procedure: 7/23/2018  Procedure performed by: Linda Joseph.  Nnamdi Salguero MD   Provider assisted by: LPN   Patient assisted by: self   How tolerated by patient: tolerated the procedure well with no complications   Comments: none

## 2018-07-26 NOTE — PROGRESS NOTES
I called his cell # on 7/26 and left a message with results of clear margins. Call back with any questions.

## 2019-02-14 ENCOUNTER — OFFICE VISIT (OUTPATIENT)
Dept: DERMATOLOGY | Facility: AMBULATORY SURGERY CENTER | Age: 64
End: 2019-02-14

## 2019-02-14 VITALS
WEIGHT: 240 LBS | SYSTOLIC BLOOD PRESSURE: 118 MMHG | HEIGHT: 73 IN | OXYGEN SATURATION: 97 % | HEART RATE: 77 BPM | BODY MASS INDEX: 31.81 KG/M2 | DIASTOLIC BLOOD PRESSURE: 80 MMHG | TEMPERATURE: 97.9 F

## 2019-02-14 DIAGNOSIS — L82.0 INFLAMED SEBORRHEIC KERATOSIS: ICD-10-CM

## 2019-02-14 DIAGNOSIS — D22.9 MULTIPLE BENIGN NEVI: ICD-10-CM

## 2019-02-14 DIAGNOSIS — D18.01 CHERRY ANGIOMA: ICD-10-CM

## 2019-02-14 DIAGNOSIS — Z86.006 HISTORY OF MELANOMA IN SITU: ICD-10-CM

## 2019-02-14 DIAGNOSIS — L82.1 SEBORRHEIC KERATOSES: ICD-10-CM

## 2019-02-14 DIAGNOSIS — L57.0 ACTINIC KERATOSES: Primary | ICD-10-CM

## 2019-02-14 DIAGNOSIS — L81.4 LENTIGINES: ICD-10-CM

## 2019-02-14 DIAGNOSIS — Z80.8 FAMILY HISTORY OF MALIGNANT MELANOMA: ICD-10-CM

## 2019-02-14 NOTE — PROGRESS NOTES
Written by Lexa Biswas, as dictated by Saeid Blancas [place]. Name: Terrie Medina       Age: 58 y.o. Date: 2/14/2019    Chief Complaint:   Chief Complaint   Patient presents with    Skin Exam     full skin exam-spot on left thigh, spot on left side of neck, spot on right arm        Subjective:    HPI  Mr. Terrie Medina is a 58 y.o. male who presents for a full skin exam.  The patient's last skin exam was on 07/02/18 and the patient does have current complaints related to his skin. He reports multiple bothersome and raised lesions on the right forearm, left thigh, neck, and lower abdomen. He notes a darkly pigmented and irritated lesion on the left neck that he would like evaluated. He is feeling well and in his usual state of health today. He has no current illnesses, no other skin concerns. His allergies, medications, medical, and social history are reviewed by me today. The patient's pertinent skin history includes :   - family history of melanoma skin cancer in his father   -MMi, left chest, excision, 07/23/18 Dr. Lucy Lezamasin  - [place]    ROS: Constitutional: Negative.     Dermatological : positive for - skin lesion changes    Social History     Socioeconomic History    Marital status:      Spouse name: Not on file    Number of children: Not on file    Years of education: Not on file    Highest education level: Not on file   Social Needs    Financial resource strain: Not on file    Food insecurity - worry: Not on file    Food insecurity - inability: Not on file    Transportation needs - medical: Not on file   [employer] needs - non-medical: Not on file   Occupational History    Not on file   Tobacco Use    Smoking status: Never Smoker    Smokeless tobacco: Never Used   Substance and Sexual Activity    Alcohol use: No    Drug use: No    Sexual activity: Not on file   Other Topics Concern    Not on file   Social History Narrative    Not on file       Family History   Problem Relation Age of Onset    Cancer Mother     Emphysema Mother     Cancer Father        Past Medical History:   Diagnosis Date    Endocrine disease     Diabetes    Family history of skin cancer     Melanoma in his father    History of actinic keratoses     Sun-damaged skin     Sunburn, blistering        Past Surgical History:   Procedure Laterality Date    HX HIP REPLACEMENT      Left side       Current Outpatient Medications   Medication Sig Dispense Refill    brompheniramin/pseudoephedrine (BROMALINE PO) Take  by mouth.  OTHER PQQ      multivitamin (ONE A DAY) tablet Take 1 Tab by mouth daily.  Omega-3-DHA-EPA-Fish Oil 1,000 mg (120 mg-180 mg) cap Take  by mouth.  atorvastatin (LIPITOR) 20 mg tablet Take 10 mg by mouth daily.  aspirin delayed-release 81 mg tablet Take  by mouth daily.  metFORMIN (GLUCOPHAGE) 500 mg tablet Take 1,500 mg by mouth two (2) times daily (with meals).  metFORMIN (GLUCOPHAGE) 1,000 mg tablet Take 1,000 mg by mouth nightly.  CALCIUM CARBONATE (CORAL CALCIUM) by Does Not Apply route.  coenzyme q10-vitamin e (COQ10 ) 100-100 mg-unit cap Take  by mouth.  cholecalciferol, VITAMIN D3, (VITAMIN D3) 5,000 unit tab tablet Take  by mouth daily.  insulin glargine (LANTUS SOLOSTAR) 100 unit/mL (3 mL) pen 60 Units by SubCUTAneous route. No Known Allergies      Objective:    Visit Vitals  /80 (BP 1 Location: Left arm, BP Patient Position: Sitting)   Pulse 77   Temp 97.9 °F (36.6 °C) (Oral)   Ht 6' 1\" (1.854 m)   Wt 240 lb (108.9 kg)   SpO2 97%   BMI 31.66 kg/m²       Cornelia Wilcox is a 58 y.o. male who appears well and in no distress. He is awake, alert, and oriented. There is no preauricular, submandibular, or cervical lymphadenopathy.   A skin examination was performed including his scalp, face (including eyelids), ears, neck, chest, back, abdomen, upper extremities (including digits/nails), lower extremities, breasts, buttocks; genital skin was not examined. He has a well-healed scar on the left chest without evidence of lesion recurrence. There are thin-scaled actinic keratoses on the scalp x5, right upper back, and nasal dorsum. There are inflamed seborrheic keratoses on the forehead x3, left neck x1, right clavicular area x2, and right forearm x2. Benna Him He has scattered waxy macules and keratotic papules consistent with seborrheic keratoses. He has pink intradermal nevi and brown junctional nevi, no concerning features for severe atypia. He has scattered red and pruple papules consistent with cherry angiomas. There are lentigines on sun exposed areas. There is a pink and brown mostly macular lesion on the left shoulder, most consistent with compound lesion-- photographed for monitoring. Left shoulder    Assessment/Plan:    1. Personal history of melanoma in situ. I discussed sun protection, sunscreen use, the warning signs of skin cancer, the need for self-skin examinations, and the need for regular practitioner exams every 6 months. The patient should follow up sooner as needed if new, changing, or symptomatic skin lesions arise. 2. Family history of melanoma skin cancer. I discussed sun protection, sunscreen use, the warning signs of skin cancer, the need for self-skin examinations, and the need for regular practitioner exams every 6 months. The patient should follow up sooner as needed if new, changing, or symptomatic skin lesions arise. 3. Actinic Keratoses. The diagnosis of this precancerous lesion related to sun exposure was reviewed. Verbal consent was obtained. I treated 7 lesions with cryotherapy and post-cryotherapy care was reviewed. 4. Inflamed seborrheic keratoses. The diagnosis and treatment with liquid nitrogen cryotherapy were reviewed. The risk or persistence or recurrence of the keratosis and the potential for pigment change at the treated site were reviewed. Verbal consent was obtained. I treated 8 lesions with cryotherapy and care was reviewed.     5. Seborrheic keratoses. The diagnosis was reviewed and the patient was reassured that no treatment is needed for these benign lesions. 6. Normal nevi. The diagnosis of normal nevi was reviewed. I discussed sun protection, sunscreen use, the warning signs of skin cancer, the need for self-skin examinations, and the need for regular practitioner exams every 6 months. The patient should follow up sooner as needed if new, changing, or symptomatic skin lesions arise. 7. Cherry angiomas. The diagnosis was reviewed and the patient was reassured that no treatment is needed for these benign lesions. 8. Solar lentigos. The diagnosis and relationship to sun exposure was reviewed. Sun protection advised. This plan was reviewed with the patient and patient agrees. All questions were answered. This scribe documentation was reviewed by me and accurately reflects the examination and decisions made by me. [facility]   OFFICE PROCEDURE PROGRESS NOTE   Chart reviewed for the following:   IStephanie, [address], have reviewed the History, Physical and updated the Allergic reactions for [facility]. TIME OUT performed immediately prior to start of procedure:   I, Shelley B. Mortimer Riedel, have performed the following reviews on [facility]   prior to the start of the procedure:     * Patient was identified by name and date of birth   * Agreement on procedure being performed was verified   * Risks and Benefits explained to the patient   * Procedure site verified and marked as necessary   * Patient was positioned for comfort   * Consent was signed and verified     Time: 10:00 AM  Date of procedure: 2/14/2019  Procedure performed by: Hellen Coates.  Mortimer Riedel  Provider assisted by: self   Patient assisted by: self   How tolerated by patient: tolerated the procedure well with no complications   Comments: none

## 2019-05-01 ENCOUNTER — OFFICE VISIT (OUTPATIENT)
Dept: PRIMARY CARE CLINIC | Age: 64
End: 2019-05-01

## 2019-05-01 ENCOUNTER — HOSPITAL ENCOUNTER (OUTPATIENT)
Dept: GENERAL RADIOLOGY | Age: 64
Discharge: HOME | End: 2019-05-01
Payer: MEDICARE

## 2019-05-01 VITALS
HEART RATE: 88 BPM | RESPIRATION RATE: 18 BRPM | BODY MASS INDEX: 32.15 KG/M2 | HEIGHT: 73 IN | DIASTOLIC BLOOD PRESSURE: 78 MMHG | SYSTOLIC BLOOD PRESSURE: 132 MMHG | TEMPERATURE: 97.9 F | WEIGHT: 242.6 LBS | OXYGEN SATURATION: 98 %

## 2019-05-01 DIAGNOSIS — R05.3 COUGH, PERSISTENT: ICD-10-CM

## 2019-05-01 DIAGNOSIS — E11.9 CONTROLLED TYPE 2 DIABETES MELLITUS WITHOUT COMPLICATION, WITH LONG-TERM CURRENT USE OF INSULIN (HCC): ICD-10-CM

## 2019-05-01 DIAGNOSIS — Z79.4 CONTROLLED TYPE 2 DIABETES MELLITUS WITHOUT COMPLICATION, WITH LONG-TERM CURRENT USE OF INSULIN (HCC): ICD-10-CM

## 2019-05-01 DIAGNOSIS — R05.3 COUGH, PERSISTENT: Primary | ICD-10-CM

## 2019-05-01 DIAGNOSIS — Z85.820 HISTORY OF MELANOMA: ICD-10-CM

## 2019-05-01 PROCEDURE — 71046 X-RAY EXAM CHEST 2 VIEWS: CPT

## 2019-05-01 RX ORDER — METFORMIN HYDROCHLORIDE 500 MG/1
1000 TABLET ORAL 2 TIMES DAILY WITH MEALS
COMMUNITY

## 2019-05-01 RX ORDER — ATORVASTATIN CALCIUM 80 MG/1
40 TABLET, FILM COATED ORAL DAILY
COMMUNITY

## 2019-05-01 RX ORDER — AMOXICILLIN 875 MG/1
875 TABLET, FILM COATED ORAL 2 TIMES DAILY
COMMUNITY
End: 2019-05-01 | Stop reason: ALTCHOICE

## 2019-05-01 RX ORDER — TAMSULOSIN HYDROCHLORIDE 0.4 MG/1
0.4 CAPSULE ORAL DAILY
COMMUNITY

## 2019-05-01 RX ORDER — LISINOPRIL 10 MG/1
TABLET ORAL DAILY
COMMUNITY

## 2019-05-01 RX ORDER — GLIPIZIDE 5 MG/1
TABLET ORAL 2 TIMES DAILY
COMMUNITY

## 2019-05-01 NOTE — PROGRESS NOTES
Chief Complaint Patient presents with  Cough 2 months Patient was seen in ER for cough and was given Antibiotic,cough medicine and nasal spray. Patient is requesting a chest xray. Visit Vitals /73 (BP 1 Location: Left arm, BP Patient Position: Sitting) Pulse 88 Temp 97.9 °F (36.6 °C) (Oral) Resp 18 Ht 6' 1\" (1.854 m) Wt 242 lb 9.6 oz (110 kg) SpO2 98% BMI 32.01 kg/m² 1. Have you been to the ER, urgent care clinic since your last visit? Hospitalized since your last visit? [facility] 
 
2. Have you seen or consulted any other health care providers outside of the [address] [place] [address] since your last visit? Include any pap smears or colon screening.  no

## 2019-05-01 NOTE — PROGRESS NOTES
Written by Fenr Laguna, as dictated by Dr. Evonne Frazier MD. 
 
[address] [facility] Hayden Oppenheim is a 71 y.o. male. HPI The patient comes in today to establish care. His sisters are nurses and have insisted that he see a doctor as he has been coughing a lot when talking to them on the phone. His cough started about 2 months ago. He notes that he has some drainage when coughing, but his phlegm is clear. Patient went to the ER at the [place] and was given a nasal spray, cough suppressant, and amoxicillin. He did not have a chest XR. Denies chest pressure. Denies HX of allergies, but believes his cough may be due to allergies. Patient has not had a chest XR in the past 5-6 years. He has been diabetic for about 20 years. Compliant on glipizide 5 mg BID, 1 tab metformin 1,000 mg NID, and Lantus 75 units. He notes that his last HbA1c was 9%, which he attributes to not watching his diet. Patient believes that if he watches his diet he could come off of Lantus. Followed by endocrinology at the [place]. He notes that he has recently started going to the gym 3 times/week. BP is high today at 145/73, 132/78 on repeat. He has been on lisinopril 10 mg for 10-12 years, noting it was prescribed to protect his kidneys as he has DM-2. Patient is followed by [facility] (dermatology), noting that Dr. Yuridia Cantu removed a melanoma. Current Outpatient Medications on File Prior to Visit Medication Sig Dispense Refill  lisinopril (PRINIVIL, ZESTRIL) 10 mg tablet Take  by mouth daily.  tamsulosin (FLOMAX) 0.4 mg capsule Take 0.4 mg by mouth daily.  glipiZIDE (GLUCOTROL) 5 mg tablet Take  by mouth two (2) times a day.  atorvastatin (LIPITOR) 80 mg tablet Take 40 mg by mouth daily.  OTHER Quercitine with Bromide.  metFORMIN (GLUCOPHAGE) 500 mg tablet Take 1,000 mg by mouth two (2) times daily (with meals).  fluticasone (VERAMYST) 27.5 mcg/actuation nasal spray 2 Sprays by Nasal route daily.  OTHER Diabetic Cough  multivitamin (ONE A DAY) tablet Take 1 Tab by mouth daily.  Omega-3-DHA-EPA-Fish Oil 1,000 mg (120 mg-180 mg) cap Take  by mouth.  aspirin delayed-release 81 mg tablet Take  by mouth daily.  coenzyme q10-vitamin e (COQ10 ) 100-100 mg-unit cap Take  by mouth. L-glutithyine  cholecalciferol, VITAMIN D3, (VITAMIN D3) 5,000 unit tab tablet Take  by mouth daily.  insulin glargine (LANTUS SOLOSTAR) 100 unit/mL (3 mL) pen 75 Units by SubCUTAneous route.  brompheniramin/pseudoephedrine (BROMALINE PO) Take  by mouth.  OTHER PQQ    
 atorvastatin (LIPITOR) 20 mg tablet Take 10 mg by mouth daily.  metFORMIN (GLUCOPHAGE) 500 mg tablet Take 1,500 mg by mouth two (2) times daily (with meals). 2 tablets in morning and 2 tablets in evening  metFORMIN (GLUCOPHAGE) 1,000 mg tablet Take 1,000 mg by mouth nightly.  CALCIUM CARBONATE (CORAL CALCIUM) by Does Not Apply route. No current facility-administered medications on file prior to visit. Past Medical History:  
Diagnosis Date  Endocrine disease Diabetes  Family history of skin cancer Melanoma in his father  History of actinic keratoses  Sun-damaged skin  Sunburn, blistering Past Surgical History:  
Procedure Laterality Date  HX HIP REPLACEMENT Left side Family History Problem Relation Age of Onset  Cancer Mother  Emphysema Mother  Cancer Father Social History Socioeconomic History  Marital status:  Spouse name: Not on file  Number of children: Not on file  Years of education: Not on file  Highest education level: Not on file Occupational History  Not on file Social Needs  Financial resource strain: Not on file  Food insecurity:  
  Worry: Not on file Inability: Not on file  Transportation needs:  
  Medical: Not on file Non-medical: Not on file Tobacco Use  Smoking status: Never Smoker  Smokeless tobacco: Never Used Substance and Sexual Activity  Alcohol use: No  
 Drug use: No  
 Sexual activity: Not on file Lifestyle  Physical activity:  
  Days per week: Not on file Minutes per session: Not on file  Stress: Not on file Relationships  Social connections:  
  Talks on phone: Not on file Gets together: Not on file Attends [employer] service: Not on file Active member of club or organization: Not on file Attends meetings of clubs or organizations: Not on file Relationship status: Not on file  Intimate partner violence:  
  Fear of current or ex partner: Not on file Emotionally abused: Not on file Physically abused: Not on file Forced sexual activity: Not on file Other Topics Concern  Not on file Social History Narrative  Not on file Review of Systems Constitutional: Negative for malaise/fatigue. HENT: Negative for congestion. Eyes: Negative for blurred vision and pain. Respiratory: Positive for cough and sputum production (clear). Negative for shortness of breath. Cardiovascular: Negative for chest pain and palpitations. Gastrointestinal: Negative for abdominal pain and heartburn. Genitourinary: Negative for frequency and urgency. Musculoskeletal: Negative for joint pain and myalgias. Neurological: Negative for dizziness, tingling, sensory change, weakness and headaches. Psychiatric/Behavioral: Negative for depression, memory loss and substance abuse. Visit Vitals /78 (BP 1 Location: Left arm, BP Patient Position: Sitting) Pulse 88 Temp 97.9 °F (36.6 °C) (Oral) Resp 18 Ht 6' 1\" (1.854 m) Wt 242 lb 9.6 oz (110 kg) SpO2 98% BMI 32.01 kg/m² Physical Exam  
Constitutional: He is oriented to person, place, and time.  He appears well-developed. No distress. Obese HENT:  
Right Ear: External ear normal.  
Left Ear: External ear normal.  
Eyes: Conjunctivae and EOM are normal. Right eye exhibits no discharge. Left eye exhibits no discharge. Neck: Normal range of motion. Neck supple. Cardiovascular: Normal rate, regular rhythm and normal heart sounds. Pulmonary/Chest: Effort normal and breath sounds normal. He has no wheezes. Abdominal: Soft. Bowel sounds are normal. There is no tenderness. Lymphadenopathy:  
  He has no cervical adenopathy. Neurological: He is alert and oriented to person, place, and time. Skin: He is not diaphoretic. Psychiatric: He has a normal mood and affect. His behavior is normal.  
Nursing note and vitals reviewed. ASSESSMENT and PLAN 
  ICD-10-CM ICD-9-CM 1. Cough, persistent R05 786.2 XR CHEST PA LAT 
 
XR chest ordered. Recommended starting OTC Zyrtec daily for at least the next 2 weeks. If XR is normal and Zyrtec does not improve his cough, I will recommend stopping lisinopril for a few weeks. If his cough improves after stopping lisinopril, he should discuss with his endocrinologist who put him for renal protection. 2. History of melanoma Z85.820 V10.82 Followed by dermatology and has regular skin exams. 3. Controlled type 2 diabetes mellitus without complication, with long-term current use of insulin (HCC) E11.9 250.00 Advised walking 10-15 minutes after eating to help with his metabolism. Discussed that starting on amaryl may help to cut down his insulin. Discussed that insulin causes carbohydrate cravings and weight gain. Recommend discussing Victoza with endocrinology. Z79.4 V58.67 This plan was reviewed with the patient and patient agrees. All questions were answered. This scribe documentation was reviewed by me and accurately reflects the examination and decisions made by me. This note will not be viewable in [address]

## 2019-06-09 ENCOUNTER — APPOINTMENT (OUTPATIENT)
Dept: GENERAL RADIOLOGY | Age: 64
DRG: 339 | End: 2019-06-09
Attending: EMERGENCY MEDICINE
Payer: MEDICARE

## 2019-06-09 ENCOUNTER — HOSPITAL ENCOUNTER (INPATIENT)
Age: 64
LOS: 2 days | Discharge: HOME | DRG: 339 | End: 2019-06-11
Attending: EMERGENCY MEDICINE | Admitting: SURGERY
Payer: MEDICARE

## 2019-06-09 ENCOUNTER — ANESTHESIA EVENT (OUTPATIENT)
Dept: SURGERY | Age: 64
DRG: 339 | End: 2019-06-09
Payer: MEDICARE

## 2019-06-09 ENCOUNTER — ANESTHESIA (OUTPATIENT)
Dept: SURGERY | Age: 64
DRG: 339 | End: 2019-06-09
Payer: MEDICARE

## 2019-06-09 ENCOUNTER — APPOINTMENT (OUTPATIENT)
Dept: CT IMAGING | Age: 64
DRG: 339 | End: 2019-06-09
Attending: EMERGENCY MEDICINE
Payer: MEDICARE

## 2019-06-09 DIAGNOSIS — K35.33 ACUTE APPENDICITIS WITH PERFORATION, LOCALIZED PERITONITIS, AND ABSCESS, WITHOUT GANGRENE: ICD-10-CM

## 2019-06-09 DIAGNOSIS — K35.80 ACUTE APPENDICITIS, UNSPECIFIED ACUTE APPENDICITIS TYPE: Primary | ICD-10-CM

## 2019-06-09 PROBLEM — K35.32 ACUTE APPENDICITIS WITH PERFORATION AND LOCALIZED PERITONITIS: Status: ACTIVE | Noted: 2019-06-09

## 2019-06-09 LAB
ALBUMIN SERPL-MCNC: 3.3 G/DL (ref 3.5–5)
ALBUMIN/GLOB SERPL: 0.8 {RATIO} (ref 1.1–2.2)
ALP SERPL-CCNC: 66 U/L (ref 45–117)
ALT SERPL-CCNC: 21 U/L (ref 12–78)
ANION GAP SERPL CALC-SCNC: 17 MMOL/L (ref 5–15)
APPEARANCE UR: CLEAR
AST SERPL-CCNC: 13 U/L (ref 15–37)
BACTERIA URNS QL MICRO: NEGATIVE /HPF
BASOPHILS # BLD: 0.1 K/UL (ref 0–0.1)
BASOPHILS NFR BLD: 0 % (ref 0–1)
BILIRUB SERPL-MCNC: 2.4 MG/DL (ref 0.2–1)
BILIRUB UR QL CFM: NEGATIVE
BUN SERPL-MCNC: 19 MG/DL (ref 6–20)
BUN/CREAT SERPL: 18 (ref 12–20)
CALCIUM SERPL-MCNC: 9.1 MG/DL (ref 8.5–10.1)
CHLORIDE SERPL-SCNC: 97 MMOL/L (ref 97–108)
CO2 SERPL-SCNC: 21 MMOL/L (ref 21–32)
COLOR UR: ABNORMAL
COMMENT, HOLDF: NORMAL
CREAT SERPL-MCNC: 1.06 MG/DL (ref 0.7–1.3)
DIFFERENTIAL METHOD BLD: ABNORMAL
EOSINOPHIL # BLD: 0 K/UL (ref 0–0.4)
EOSINOPHIL NFR BLD: 0 % (ref 0–7)
EPITH CASTS URNS QL MICRO: ABNORMAL /LPF
ERYTHROCYTE [DISTWIDTH] IN BLOOD BY AUTOMATED COUNT: 15 % (ref 11.5–14.5)
GLOBULIN SER CALC-MCNC: 3.9 G/DL (ref 2–4)
GLUCOSE BLD STRIP.AUTO-MCNC: 186 MG/DL (ref 65–100)
GLUCOSE BLD STRIP.AUTO-MCNC: 189 MG/DL (ref 65–100)
GLUCOSE SERPL-MCNC: 185 MG/DL (ref 65–100)
GLUCOSE UR STRIP.AUTO-MCNC: 250 MG/DL
GRAN CASTS URNS QL MICRO: ABNORMAL /LPF
HCT VFR BLD AUTO: 41.4 % (ref 36.6–50.3)
HGB BLD-MCNC: 13.8 G/DL (ref 12.1–17)
HGB UR QL STRIP: ABNORMAL
IMM GRANULOCYTES # BLD AUTO: 0.1 K/UL (ref 0–0.04)
IMM GRANULOCYTES NFR BLD AUTO: 1 % (ref 0–0.5)
KETONES UR QL STRIP.AUTO: >80 MG/DL
LACTATE SERPL-SCNC: 1.1 MMOL/L (ref 0.4–2)
LEUKOCYTE ESTERASE UR QL STRIP.AUTO: NEGATIVE
LYMPHOCYTES # BLD: 1.4 K/UL (ref 0.8–3.5)
LYMPHOCYTES NFR BLD: 10 % (ref 12–49)
MCH RBC QN AUTO: 26.9 PG (ref 26–34)
MCHC RBC AUTO-ENTMCNC: 33.3 G/DL (ref 30–36.5)
MCV RBC AUTO: 80.7 FL (ref 80–99)
MONOCYTES # BLD: 1.4 K/UL (ref 0–1)
MONOCYTES NFR BLD: 9 % (ref 5–13)
MUCOUS THREADS URNS QL MICRO: ABNORMAL /LPF
NEUTS SEG # BLD: 12.2 K/UL (ref 1.8–8)
NEUTS SEG NFR BLD: 80 % (ref 32–75)
NITRITE UR QL STRIP.AUTO: NEGATIVE
NRBC # BLD: 0 K/UL (ref 0–0.01)
NRBC BLD-RTO: 0 PER 100 WBC
PH UR STRIP: 6 [PH] (ref 5–8)
PLATELET # BLD AUTO: 258 K/UL (ref 150–400)
PMV BLD AUTO: 8.8 FL (ref 8.9–12.9)
POTASSIUM SERPL-SCNC: 3.8 MMOL/L (ref 3.5–5.1)
PROT SERPL-MCNC: 7.2 G/DL (ref 6.4–8.2)
PROT UR STRIP-MCNC: 100 MG/DL
RBC # BLD AUTO: 5.13 M/UL (ref 4.1–5.7)
RBC #/AREA URNS HPF: ABNORMAL /HPF (ref 0–5)
SAMPLES BEING HELD,HOLD: NORMAL
SERVICE CMNT-IMP: ABNORMAL
SERVICE CMNT-IMP: ABNORMAL
SODIUM SERPL-SCNC: 135 MMOL/L (ref 136–145)
SP GR UR REFRACTOMETRY: 1.02 (ref 1–1.03)
UR CULT HOLD, URHOLD: NORMAL
UROBILINOGEN UR QL STRIP.AUTO: 1 EU/DL (ref 0.2–1)
WBC # BLD AUTO: 15.2 K/UL (ref 4.1–11.1)
WBC URNS QL MICRO: ABNORMAL /HPF (ref 0–4)

## 2019-06-09 PROCEDURE — 80053 COMPREHEN METABOLIC PANEL: CPT

## 2019-06-09 PROCEDURE — 77030039266 HC ADH SKN EXOFIN S2SG -A: Performed by: SURGERY

## 2019-06-09 PROCEDURE — 77030012407 HC DRN WND BARD -B: Performed by: SURGERY

## 2019-06-09 PROCEDURE — 74176 CT ABD & PELVIS W/O CONTRAST: CPT

## 2019-06-09 PROCEDURE — 77030022473 HC HNDL ENDO GIA UNIV USDA -C: Performed by: SURGERY

## 2019-06-09 PROCEDURE — 0DTJ4ZZ RESECTION OF APPENDIX, PERCUTANEOUS ENDOSCOPIC APPROACH: ICD-10-PCS | Performed by: SURGERY

## 2019-06-09 PROCEDURE — 77030037366 HC STPLR ENDO TRI-STPLR COVD -C: Performed by: SURGERY

## 2019-06-09 PROCEDURE — 88304 TISSUE EXAM BY PATHOLOGIST: CPT

## 2019-06-09 PROCEDURE — 81001 URINALYSIS AUTO W/SCOPE: CPT

## 2019-06-09 PROCEDURE — 93005 ELECTROCARDIOGRAM TRACING: CPT

## 2019-06-09 PROCEDURE — 77030020747 HC TU INSUF ENDOSC TELE -A: Performed by: SURGERY

## 2019-06-09 PROCEDURE — 87040 BLOOD CULTURE FOR BACTERIA: CPT

## 2019-06-09 PROCEDURE — 74011250636 HC RX REV CODE- 250/636

## 2019-06-09 PROCEDURE — 76060000034 HC ANESTHESIA 1.5 TO 2 HR: Performed by: SURGERY

## 2019-06-09 PROCEDURE — 82962 GLUCOSE BLOOD TEST: CPT

## 2019-06-09 PROCEDURE — 77030002916 HC SUT ETHLN J&J -A: Performed by: SURGERY

## 2019-06-09 PROCEDURE — 77030037032 HC INSRT SCIS CLICKLLINE DISP STOR -B: Performed by: SURGERY

## 2019-06-09 PROCEDURE — 77030009852 HC PCH RTVR ENDOSC COVD -B: Performed by: SURGERY

## 2019-06-09 PROCEDURE — 65270000029 HC RM PRIVATE

## 2019-06-09 PROCEDURE — 85025 COMPLETE CBC W/AUTO DIFF WBC: CPT

## 2019-06-09 PROCEDURE — 74011000258 HC RX REV CODE- 258: Performed by: EMERGENCY MEDICINE

## 2019-06-09 PROCEDURE — 74011000250 HC RX REV CODE- 250

## 2019-06-09 PROCEDURE — 77030035030 HC NDL INSUF VERES 150ML DISP COVD -B: Performed by: SURGERY

## 2019-06-09 PROCEDURE — 71045 X-RAY EXAM CHEST 1 VIEW: CPT

## 2019-06-09 PROCEDURE — 83605 ASSAY OF LACTIC ACID: CPT

## 2019-06-09 PROCEDURE — 77030035051: Performed by: SURGERY

## 2019-06-09 PROCEDURE — 99284 EMERGENCY DEPT VISIT MOD MDM: CPT

## 2019-06-09 PROCEDURE — 74011250637 HC RX REV CODE- 250/637: Performed by: EMERGENCY MEDICINE

## 2019-06-09 PROCEDURE — 77030031139 HC SUT VCRL2 J&J -A: Performed by: SURGERY

## 2019-06-09 PROCEDURE — 76210000063 HC OR PH I REC FIRST 0.5 HR: Performed by: SURGERY

## 2019-06-09 PROCEDURE — 77030013079 HC BLNKT BAIR HGGR 3M -A: Performed by: ANESTHESIOLOGY

## 2019-06-09 PROCEDURE — 74011250636 HC RX REV CODE- 250/636: Performed by: SURGERY

## 2019-06-09 PROCEDURE — 74011250636 HC RX REV CODE- 250/636: Performed by: EMERGENCY MEDICINE

## 2019-06-09 PROCEDURE — 77030034628 HC LIGASURE LAP SEAL DIV MD COVD -F: Performed by: SURGERY

## 2019-06-09 PROCEDURE — 77030013567 HC DRN WND RESERV BARD -A: Performed by: SURGERY

## 2019-06-09 PROCEDURE — 77030002933 HC SUT MCRYL J&J -A: Performed by: SURGERY

## 2019-06-09 PROCEDURE — 77030008684 HC TU ET CUF COVD -B: Performed by: ANESTHESIOLOGY

## 2019-06-09 PROCEDURE — 77030034849: Performed by: SURGERY

## 2019-06-09 PROCEDURE — 77030032490 HC SLV COMPR SCD KNE COVD -B: Performed by: SURGERY

## 2019-06-09 PROCEDURE — 77030011640 HC PAD GRND REM COVD -A: Performed by: SURGERY

## 2019-06-09 PROCEDURE — 36415 COLL VENOUS BLD VENIPUNCTURE: CPT

## 2019-06-09 PROCEDURE — 76010000149 HC OR TIME 1 TO 1.5 HR: Performed by: SURGERY

## 2019-06-09 PROCEDURE — 74011000250 HC RX REV CODE- 250: Performed by: SURGERY

## 2019-06-09 PROCEDURE — 77030035048 HC TRCR ENDOSC OPTCL COVD -B: Performed by: SURGERY

## 2019-06-09 PROCEDURE — 77030008756 HC TU IRR SUC STRY -B: Performed by: SURGERY

## 2019-06-09 PROCEDURE — 77030035045 HC TRCR ENDOSC VRSPRT BLDLSS COVD -B: Performed by: SURGERY

## 2019-06-09 PROCEDURE — 77030020263 HC SOL INJ SOD CL0.9% LFCR 1000ML: Performed by: SURGERY

## 2019-06-09 RX ORDER — LIDOCAINE HYDROCHLORIDE 10 MG/ML
0.1 INJECTION, SOLUTION EPIDURAL; INFILTRATION; INTRACAUDAL; PERINEURAL AS NEEDED
Status: DISCONTINUED | OUTPATIENT
Start: 2019-06-09 | End: 2019-06-09 | Stop reason: HOSPADM

## 2019-06-09 RX ORDER — SODIUM CHLORIDE 0.9 % (FLUSH) 0.9 %
5-40 SYRINGE (ML) INJECTION EVERY 8 HOURS
Status: DISCONTINUED | OUTPATIENT
Start: 2019-06-09 | End: 2019-06-11 | Stop reason: HOSPADM

## 2019-06-09 RX ORDER — ONDANSETRON 2 MG/ML
INJECTION INTRAMUSCULAR; INTRAVENOUS AS NEEDED
Status: DISCONTINUED | OUTPATIENT
Start: 2019-06-09 | End: 2019-06-09 | Stop reason: HOSPADM

## 2019-06-09 RX ORDER — OXYCODONE AND ACETAMINOPHEN 5; 325 MG/1; MG/1
2 TABLET ORAL
Status: DISCONTINUED | OUTPATIENT
Start: 2019-06-09 | End: 2019-06-11 | Stop reason: HOSPADM

## 2019-06-09 RX ORDER — BUPIVACAINE HYDROCHLORIDE AND EPINEPHRINE 2.5; 5 MG/ML; UG/ML
INJECTION, SOLUTION EPIDURAL; INFILTRATION; INTRACAUDAL; PERINEURAL AS NEEDED
Status: DISCONTINUED | OUTPATIENT
Start: 2019-06-09 | End: 2019-06-09 | Stop reason: HOSPADM

## 2019-06-09 RX ORDER — SODIUM CHLORIDE 0.9 % (FLUSH) 0.9 %
5-40 SYRINGE (ML) INJECTION EVERY 8 HOURS
Status: DISCONTINUED | OUTPATIENT
Start: 2019-06-09 | End: 2019-06-09 | Stop reason: HOSPADM

## 2019-06-09 RX ORDER — HYDROMORPHONE HYDROCHLORIDE 1 MG/ML
0.5 INJECTION, SOLUTION INTRAMUSCULAR; INTRAVENOUS; SUBCUTANEOUS
Status: DISCONTINUED | OUTPATIENT
Start: 2019-06-09 | End: 2019-06-11 | Stop reason: HOSPADM

## 2019-06-09 RX ORDER — ONDANSETRON 2 MG/ML
4 INJECTION INTRAMUSCULAR; INTRAVENOUS AS NEEDED
Status: DISCONTINUED | OUTPATIENT
Start: 2019-06-09 | End: 2019-06-09 | Stop reason: HOSPADM

## 2019-06-09 RX ORDER — SUCCINYLCHOLINE CHLORIDE 20 MG/ML
INJECTION INTRAMUSCULAR; INTRAVENOUS AS NEEDED
Status: DISCONTINUED | OUTPATIENT
Start: 2019-06-09 | End: 2019-06-09 | Stop reason: HOSPADM

## 2019-06-09 RX ORDER — HYDROMORPHONE HYDROCHLORIDE 1 MG/ML
0.2 INJECTION, SOLUTION INTRAMUSCULAR; INTRAVENOUS; SUBCUTANEOUS
Status: DISCONTINUED | OUTPATIENT
Start: 2019-06-09 | End: 2019-06-09 | Stop reason: HOSPADM

## 2019-06-09 RX ORDER — MIDAZOLAM HYDROCHLORIDE 1 MG/ML
INJECTION, SOLUTION INTRAMUSCULAR; INTRAVENOUS AS NEEDED
Status: DISCONTINUED | OUTPATIENT
Start: 2019-06-09 | End: 2019-06-09 | Stop reason: HOSPADM

## 2019-06-09 RX ORDER — SODIUM CHLORIDE 0.9 % (FLUSH) 0.9 %
5-40 SYRINGE (ML) INJECTION AS NEEDED
Status: DISCONTINUED | OUTPATIENT
Start: 2019-06-09 | End: 2019-06-09 | Stop reason: HOSPADM

## 2019-06-09 RX ORDER — FENTANYL CITRATE 50 UG/ML
25 INJECTION, SOLUTION INTRAMUSCULAR; INTRAVENOUS
Status: DISCONTINUED | OUTPATIENT
Start: 2019-06-09 | End: 2019-06-09 | Stop reason: HOSPADM

## 2019-06-09 RX ORDER — INSULIN LISPRO 100 [IU]/ML
INJECTION, SOLUTION INTRAVENOUS; SUBCUTANEOUS
Status: DISCONTINUED | OUTPATIENT
Start: 2019-06-10 | End: 2019-06-11 | Stop reason: HOSPADM

## 2019-06-09 RX ORDER — MAGNESIUM SULFATE 100 %
4 CRYSTALS MISCELLANEOUS AS NEEDED
Status: DISCONTINUED | OUTPATIENT
Start: 2019-06-09 | End: 2019-06-11 | Stop reason: HOSPADM

## 2019-06-09 RX ORDER — OXYCODONE AND ACETAMINOPHEN 5; 325 MG/1; MG/1
1 TABLET ORAL AS NEEDED
Status: DISCONTINUED | OUTPATIENT
Start: 2019-06-09 | End: 2019-06-09 | Stop reason: HOSPADM

## 2019-06-09 RX ORDER — FENTANYL CITRATE 50 UG/ML
INJECTION, SOLUTION INTRAMUSCULAR; INTRAVENOUS AS NEEDED
Status: DISCONTINUED | OUTPATIENT
Start: 2019-06-09 | End: 2019-06-09 | Stop reason: HOSPADM

## 2019-06-09 RX ORDER — DEXTROSE 50 % IN WATER (D50W) INTRAVENOUS SYRINGE
25-50 AS NEEDED
Status: DISCONTINUED | OUTPATIENT
Start: 2019-06-09 | End: 2019-06-11 | Stop reason: HOSPADM

## 2019-06-09 RX ORDER — ATORVASTATIN CALCIUM 10 MG/1
10 TABLET, FILM COATED ORAL DAILY
Status: DISCONTINUED | OUTPATIENT
Start: 2019-06-10 | End: 2019-06-10

## 2019-06-09 RX ORDER — MORPHINE SULFATE 10 MG/ML
2 INJECTION, SOLUTION INTRAMUSCULAR; INTRAVENOUS
Status: DISCONTINUED | OUTPATIENT
Start: 2019-06-09 | End: 2019-06-09 | Stop reason: HOSPADM

## 2019-06-09 RX ORDER — PROPOFOL 10 MG/ML
INJECTION, EMULSION INTRAVENOUS AS NEEDED
Status: DISCONTINUED | OUTPATIENT
Start: 2019-06-09 | End: 2019-06-09 | Stop reason: HOSPADM

## 2019-06-09 RX ORDER — LIDOCAINE HYDROCHLORIDE 20 MG/ML
INJECTION, SOLUTION EPIDURAL; INFILTRATION; INTRACAUDAL; PERINEURAL AS NEEDED
Status: DISCONTINUED | OUTPATIENT
Start: 2019-06-09 | End: 2019-06-09 | Stop reason: HOSPADM

## 2019-06-09 RX ORDER — ROCURONIUM BROMIDE 10 MG/ML
INJECTION, SOLUTION INTRAVENOUS AS NEEDED
Status: DISCONTINUED | OUTPATIENT
Start: 2019-06-09 | End: 2019-06-09 | Stop reason: HOSPADM

## 2019-06-09 RX ORDER — MIDAZOLAM HYDROCHLORIDE 1 MG/ML
1 INJECTION, SOLUTION INTRAMUSCULAR; INTRAVENOUS AS NEEDED
Status: DISCONTINUED | OUTPATIENT
Start: 2019-06-09 | End: 2019-06-09 | Stop reason: HOSPADM

## 2019-06-09 RX ORDER — OXYCODONE AND ACETAMINOPHEN 5; 325 MG/1; MG/1
1 TABLET ORAL
Status: DISCONTINUED | OUTPATIENT
Start: 2019-06-09 | End: 2019-06-11 | Stop reason: HOSPADM

## 2019-06-09 RX ORDER — SODIUM CHLORIDE, SODIUM LACTATE, POTASSIUM CHLORIDE, CALCIUM CHLORIDE 600; 310; 30; 20 MG/100ML; MG/100ML; MG/100ML; MG/100ML
INJECTION, SOLUTION INTRAVENOUS
Status: DISCONTINUED | OUTPATIENT
Start: 2019-06-09 | End: 2019-06-09 | Stop reason: HOSPADM

## 2019-06-09 RX ORDER — SODIUM CHLORIDE, SODIUM LACTATE, POTASSIUM CHLORIDE, CALCIUM CHLORIDE 600; 310; 30; 20 MG/100ML; MG/100ML; MG/100ML; MG/100ML
125 INJECTION, SOLUTION INTRAVENOUS CONTINUOUS
Status: DISCONTINUED | OUTPATIENT
Start: 2019-06-09 | End: 2019-06-09 | Stop reason: HOSPADM

## 2019-06-09 RX ORDER — SODIUM CHLORIDE, SODIUM LACTATE, POTASSIUM CHLORIDE, CALCIUM CHLORIDE 600; 310; 30; 20 MG/100ML; MG/100ML; MG/100ML; MG/100ML
75 INJECTION, SOLUTION INTRAVENOUS CONTINUOUS
Status: DISCONTINUED | OUTPATIENT
Start: 2019-06-09 | End: 2019-06-11 | Stop reason: HOSPADM

## 2019-06-09 RX ORDER — ONDANSETRON 2 MG/ML
4 INJECTION INTRAMUSCULAR; INTRAVENOUS
Status: DISCONTINUED | OUTPATIENT
Start: 2019-06-09 | End: 2019-06-11 | Stop reason: HOSPADM

## 2019-06-09 RX ORDER — GLYCOPYRROLATE 0.2 MG/ML
INJECTION INTRAMUSCULAR; INTRAVENOUS AS NEEDED
Status: DISCONTINUED | OUTPATIENT
Start: 2019-06-09 | End: 2019-06-09 | Stop reason: HOSPADM

## 2019-06-09 RX ORDER — NEOSTIGMINE METHYLSULFATE 1 MG/ML
INJECTION INTRAVENOUS AS NEEDED
Status: DISCONTINUED | OUTPATIENT
Start: 2019-06-09 | End: 2019-06-09 | Stop reason: HOSPADM

## 2019-06-09 RX ORDER — ACETAMINOPHEN 325 MG/1
650 TABLET ORAL ONCE
Status: DISCONTINUED | OUTPATIENT
Start: 2019-06-09 | End: 2019-06-09 | Stop reason: HOSPADM

## 2019-06-09 RX ORDER — FLUTICASONE PROPIONATE 50 MCG
2 SPRAY, SUSPENSION (ML) NASAL DAILY PRN
Status: DISCONTINUED | OUTPATIENT
Start: 2019-06-09 | End: 2019-06-11 | Stop reason: HOSPADM

## 2019-06-09 RX ORDER — LABETALOL HYDROCHLORIDE 5 MG/ML
INJECTION, SOLUTION INTRAVENOUS AS NEEDED
Status: DISCONTINUED | OUTPATIENT
Start: 2019-06-09 | End: 2019-06-09 | Stop reason: HOSPADM

## 2019-06-09 RX ORDER — TAMSULOSIN HYDROCHLORIDE 0.4 MG/1
0.4 CAPSULE ORAL DAILY
Status: DISCONTINUED | OUTPATIENT
Start: 2019-06-10 | End: 2019-06-11 | Stop reason: HOSPADM

## 2019-06-09 RX ORDER — SODIUM CHLORIDE 9 MG/ML
25 INJECTION, SOLUTION INTRAVENOUS CONTINUOUS
Status: DISCONTINUED | OUTPATIENT
Start: 2019-06-09 | End: 2019-06-09 | Stop reason: HOSPADM

## 2019-06-09 RX ORDER — MIDAZOLAM HYDROCHLORIDE 1 MG/ML
0.5 INJECTION, SOLUTION INTRAMUSCULAR; INTRAVENOUS
Status: DISCONTINUED | OUTPATIENT
Start: 2019-06-09 | End: 2019-06-09 | Stop reason: HOSPADM

## 2019-06-09 RX ORDER — ATORVASTATIN CALCIUM 40 MG/1
40 TABLET, FILM COATED ORAL DAILY
Status: DISCONTINUED | OUTPATIENT
Start: 2019-06-10 | End: 2019-06-11 | Stop reason: HOSPADM

## 2019-06-09 RX ORDER — DEXMEDETOMIDINE HYDROCHLORIDE 4 UG/ML
INJECTION, SOLUTION INTRAVENOUS AS NEEDED
Status: DISCONTINUED | OUTPATIENT
Start: 2019-06-09 | End: 2019-06-09 | Stop reason: HOSPADM

## 2019-06-09 RX ORDER — FENTANYL CITRATE 50 UG/ML
50 INJECTION, SOLUTION INTRAMUSCULAR; INTRAVENOUS AS NEEDED
Status: DISCONTINUED | OUTPATIENT
Start: 2019-06-09 | End: 2019-06-09 | Stop reason: HOSPADM

## 2019-06-09 RX ORDER — ACETAMINOPHEN 500 MG
1000 TABLET ORAL
Status: COMPLETED | OUTPATIENT
Start: 2019-06-09 | End: 2019-06-09

## 2019-06-09 RX ORDER — ASPIRIN 81 MG/1
81 TABLET ORAL DAILY
Status: DISCONTINUED | OUTPATIENT
Start: 2019-06-10 | End: 2019-06-11 | Stop reason: HOSPADM

## 2019-06-09 RX ORDER — SODIUM CHLORIDE 0.9 % (FLUSH) 0.9 %
5-40 SYRINGE (ML) INJECTION AS NEEDED
Status: DISCONTINUED | OUTPATIENT
Start: 2019-06-09 | End: 2019-06-11 | Stop reason: HOSPADM

## 2019-06-09 RX ORDER — DIPHENHYDRAMINE HYDROCHLORIDE 50 MG/ML
12.5 INJECTION, SOLUTION INTRAMUSCULAR; INTRAVENOUS AS NEEDED
Status: DISCONTINUED | OUTPATIENT
Start: 2019-06-09 | End: 2019-06-09 | Stop reason: HOSPADM

## 2019-06-09 RX ORDER — LISINOPRIL 10 MG/1
10 TABLET ORAL DAILY
Status: DISCONTINUED | OUTPATIENT
Start: 2019-06-10 | End: 2019-06-11 | Stop reason: HOSPADM

## 2019-06-09 RX ADMIN — PROPOFOL 50 MG: 10 INJECTION, EMULSION INTRAVENOUS at 22:07

## 2019-06-09 RX ADMIN — HYDROMORPHONE HYDROCHLORIDE 0.5 MG: 1 INJECTION, SOLUTION INTRAMUSCULAR; INTRAVENOUS; SUBCUTANEOUS at 22:37

## 2019-06-09 RX ADMIN — SODIUM CHLORIDE, SODIUM LACTATE, POTASSIUM CHLORIDE, CALCIUM CHLORIDE: 600; 310; 30; 20 INJECTION, SOLUTION INTRAVENOUS at 21:11

## 2019-06-09 RX ADMIN — HYDROMORPHONE HYDROCHLORIDE 0.5 MG: 1 INJECTION, SOLUTION INTRAMUSCULAR; INTRAVENOUS; SUBCUTANEOUS at 22:28

## 2019-06-09 RX ADMIN — NEOSTIGMINE METHYLSULFATE 4.5 MG: 1 INJECTION INTRAVENOUS at 22:20

## 2019-06-09 RX ADMIN — DEXMEDETOMIDINE HYDROCHLORIDE 4 MCG: 4 INJECTION, SOLUTION INTRAVENOUS at 21:50

## 2019-06-09 RX ADMIN — MIDAZOLAM HYDROCHLORIDE 2 MG: 1 INJECTION, SOLUTION INTRAMUSCULAR; INTRAVENOUS at 21:09

## 2019-06-09 RX ADMIN — SODIUM CHLORIDE, SODIUM LACTATE, POTASSIUM CHLORIDE, CALCIUM CHLORIDE: 600; 310; 30; 20 INJECTION, SOLUTION INTRAVENOUS at 21:54

## 2019-06-09 RX ADMIN — DEXMEDETOMIDINE HYDROCHLORIDE 4 MCG: 4 INJECTION, SOLUTION INTRAVENOUS at 22:16

## 2019-06-09 RX ADMIN — SODIUM CHLORIDE 1000 ML: 900 INJECTION, SOLUTION INTRAVENOUS at 17:52

## 2019-06-09 RX ADMIN — FENTANYL CITRATE 100 MCG: 50 INJECTION, SOLUTION INTRAMUSCULAR; INTRAVENOUS at 21:14

## 2019-06-09 RX ADMIN — DEXMEDETOMIDINE HYDROCHLORIDE 4 MCG: 4 INJECTION, SOLUTION INTRAVENOUS at 22:09

## 2019-06-09 RX ADMIN — ROCURONIUM BROMIDE 25 MG: 10 INJECTION, SOLUTION INTRAVENOUS at 21:25

## 2019-06-09 RX ADMIN — ONDANSETRON 4 MG: 2 INJECTION INTRAMUSCULAR; INTRAVENOUS at 22:18

## 2019-06-09 RX ADMIN — GLYCOPYRROLATE 0.4 MG: 0.2 INJECTION INTRAMUSCULAR; INTRAVENOUS at 22:20

## 2019-06-09 RX ADMIN — PROPOFOL 150 MG: 10 INJECTION, EMULSION INTRAVENOUS at 21:14

## 2019-06-09 RX ADMIN — LIDOCAINE HYDROCHLORIDE 100 MG: 20 INJECTION, SOLUTION EPIDURAL; INFILTRATION; INTRACAUDAL; PERINEURAL at 21:14

## 2019-06-09 RX ADMIN — SODIUM CHLORIDE, SODIUM LACTATE, POTASSIUM CHLORIDE, AND CALCIUM CHLORIDE 150 ML/HR: 600; 310; 30; 20 INJECTION, SOLUTION INTRAVENOUS at 19:31

## 2019-06-09 RX ADMIN — PIPERACILLIN AND TAZOBACTAM 3.38 G: 3; .375 INJECTION, POWDER, FOR SOLUTION INTRAVENOUS at 18:56

## 2019-06-09 RX ADMIN — SUCCINYLCHOLINE CHLORIDE 140 MG: 20 INJECTION INTRAMUSCULAR; INTRAVENOUS at 21:14

## 2019-06-09 RX ADMIN — ROCURONIUM BROMIDE 5 MG: 10 INJECTION, SOLUTION INTRAVENOUS at 21:14

## 2019-06-09 RX ADMIN — ACETAMINOPHEN 1000 MG: 500 TABLET ORAL at 17:51

## 2019-06-09 RX ADMIN — ROCURONIUM BROMIDE 5 MG: 10 INJECTION, SOLUTION INTRAVENOUS at 21:43

## 2019-06-09 RX ADMIN — ROCURONIUM BROMIDE 10 MG: 10 INJECTION, SOLUTION INTRAVENOUS at 22:04

## 2019-06-09 RX ADMIN — LABETALOL HYDROCHLORIDE 5 MG: 5 INJECTION, SOLUTION INTRAVENOUS at 22:16

## 2019-06-09 RX ADMIN — SODIUM CHLORIDE, SODIUM LACTATE, POTASSIUM CHLORIDE, AND CALCIUM CHLORIDE 150 ML/HR: 600; 310; 30; 20 INJECTION, SOLUTION INTRAVENOUS at 22:57

## 2019-06-09 NOTE — ED PROVIDER NOTES
HPI The patient has a 2 day history of right flank/right lower quadrant pain, low-grade fever and occasional cough. He thinks he may have a kidney stone. He also complains of decreased p.o. intake and decreased urinary output for the past several days. On arrival here his temperature is 101. He denies having shortness of breath, nausea/vomiting/diarrhea, urinary symptoms or other complaints.       Past Medical History:   Diagnosis Date    Endocrine disease     Diabetes    Family history of skin cancer     Melanoma in his father    History of actinic keratoses     Sun-damaged skin     Sunburn, blistering        Past Surgical History:   Procedure Laterality Date    HX HIP REPLACEMENT      Left side         Family History:   Problem Relation Age of Onset    Cancer Mother     Emphysema Mother     Cancer Father        Social History     Socioeconomic History    Marital status:      Spouse name: Not on file    Number of children: Not on file    Years of education: Not on file    Highest education level: Not on file   Occupational History    Not on file   Social Needs    Financial resource strain: Not on file    Food insecurity:     Worry: Not on file     Inability: Not on file    Transportation needs:     Medical: Not on file     Non-medical: Not on file   Tobacco Use    Smoking status: Never Smoker    Smokeless tobacco: Never Used   Substance and Sexual Activity    Alcohol use: No    Drug use: No    Sexual activity: Not on file   Lifestyle    Physical activity:     Days per week: Not on file     Minutes per session: Not on file    Stress: Not on file   Relationships    Social connections:     Talks on phone: Not on file     Gets together: Not on file     Attends Latter day service: Not on file     Active member of club or organization: Not on file     Attends meetings of clubs or organizations: Not on file     Relationship status: Not on file    Intimate partner violence:     Fear of current or ex partner: Not on file     Emotionally abused: Not on file     Physically abused: Not on file     Forced sexual activity: Not on file   Other Topics Concern    Not on file   Social History Narrative    Not on file         ALLERGIES: Patient has no known allergies. Review of Systems   Constitutional: Positive for appetite change and fever. Eyes: Negative for visual disturbance. Respiratory: Positive for cough. Negative for shortness of breath. Cardiovascular: Negative for chest pain. Gastrointestinal: Positive for abdominal pain. Negative for diarrhea, nausea and vomiting. Genitourinary: Negative for decreased urine volume and dysuria. Musculoskeletal: Negative. Negative for back pain and neck stiffness. Skin: Negative for rash. Neurological: Negative. Negative for syncope and headaches. Psychiatric/Behavioral: Negative for confusion. All other systems reviewed and are negative. Vitals:    06/09/19 1743   BP: 149/66   Pulse: (!) 126   Resp: 20   Temp: (!) 101.5 °F (38.6 °C)   SpO2: 94%   Weight: 109.1 kg (240 lb 8.4 oz)   Height: 6' (1.829 m)            Physical Exam   Constitutional: He appears well-developed and well-nourished. No distress. HENT:   Head: Normocephalic. Eyes: Pupils are equal, round, and reactive to light. Neck: Normal range of motion. Cardiovascular: Normal rate. No murmur heard. Pulmonary/Chest: Effort normal and breath sounds normal.   Abdominal: Soft. There is tenderness. ttp in rlq and r flank   Musculoskeletal: Normal range of motion. Neurological: He is alert. Skin: Skin is warm and dry. Capillary refill takes less than 2 seconds. Psychiatric: He has a normal mood and affect. His behavior is normal.        MDM       Procedures ED EKG interpretation:  Rhythm:s. Tach rate 123. nsst changes. This EKG was interpreted by Rosalba Wilson MD,ED Provider.       7:33 PM  I spoke with Dr. Tamara Stoddard on the phone who is requesting ED-to-ED transfer in order to facilitate getting pt to the OR. Andres Claros, ED physician at [facility], who accepts pt for transfer.

## 2019-06-09 NOTE — ED TRIAGE NOTES
Triage Note: Patient thinks he is having a kidney stone. On Friday started with mid back pain on the right side. Complains of decrease urine output. Denies nausea, but has decreased appetite. +fever and chills.  Max temp 101

## 2019-06-10 LAB
ANION GAP SERPL CALC-SCNC: 6 MMOL/L (ref 5–15)
ATRIAL RATE: 123 BPM
BUN SERPL-MCNC: 15 MG/DL (ref 6–20)
BUN/CREAT SERPL: 18 (ref 12–20)
CALCIUM SERPL-MCNC: 8.6 MG/DL (ref 8.5–10.1)
CALCULATED P AXIS, ECG09: 40 DEGREES
CALCULATED R AXIS, ECG10: 70 DEGREES
CALCULATED T AXIS, ECG11: -23 DEGREES
CHLORIDE SERPL-SCNC: 106 MMOL/L (ref 97–108)
CO2 SERPL-SCNC: 26 MMOL/L (ref 21–32)
CREAT SERPL-MCNC: 0.84 MG/DL (ref 0.7–1.3)
DIAGNOSIS, 93000: NORMAL
ERYTHROCYTE [DISTWIDTH] IN BLOOD BY AUTOMATED COUNT: 15.3 % (ref 11.5–14.5)
GLUCOSE BLD STRIP.AUTO-MCNC: 162 MG/DL (ref 65–100)
GLUCOSE BLD STRIP.AUTO-MCNC: 173 MG/DL (ref 65–100)
GLUCOSE BLD STRIP.AUTO-MCNC: 181 MG/DL (ref 65–100)
GLUCOSE BLD STRIP.AUTO-MCNC: 230 MG/DL (ref 65–100)
GLUCOSE SERPL-MCNC: 179 MG/DL (ref 65–100)
HCT VFR BLD AUTO: 38.9 % (ref 36.6–50.3)
HGB BLD-MCNC: 12.4 G/DL (ref 12.1–17)
MCH RBC QN AUTO: 26.9 PG (ref 26–34)
MCHC RBC AUTO-ENTMCNC: 31.9 G/DL (ref 30–36.5)
MCV RBC AUTO: 84.4 FL (ref 80–99)
NRBC # BLD: 0 K/UL (ref 0–0.01)
NRBC BLD-RTO: 0 PER 100 WBC
P-R INTERVAL, ECG05: 126 MS
PLATELET # BLD AUTO: 214 K/UL (ref 150–400)
PMV BLD AUTO: 9.3 FL (ref 8.9–12.9)
POTASSIUM SERPL-SCNC: 4.2 MMOL/L (ref 3.5–5.1)
Q-T INTERVAL, ECG07: 296 MS
QRS DURATION, ECG06: 86 MS
QTC CALCULATION (BEZET), ECG08: 423 MS
RBC # BLD AUTO: 4.61 M/UL (ref 4.1–5.7)
SERVICE CMNT-IMP: ABNORMAL
SODIUM SERPL-SCNC: 138 MMOL/L (ref 136–145)
VENTRICULAR RATE, ECG03: 123 BPM
WBC # BLD AUTO: 10.6 K/UL (ref 4.1–11.1)

## 2019-06-10 PROCEDURE — 74011636637 HC RX REV CODE- 636/637: Performed by: SURGERY

## 2019-06-10 PROCEDURE — 74011250636 HC RX REV CODE- 250/636: Performed by: SURGERY

## 2019-06-10 PROCEDURE — 85027 COMPLETE CBC AUTOMATED: CPT

## 2019-06-10 PROCEDURE — 36415 COLL VENOUS BLD VENIPUNCTURE: CPT

## 2019-06-10 PROCEDURE — 82962 GLUCOSE BLOOD TEST: CPT

## 2019-06-10 PROCEDURE — 80048 BASIC METABOLIC PNL TOTAL CA: CPT

## 2019-06-10 PROCEDURE — 74011250637 HC RX REV CODE- 250/637: Performed by: SURGERY

## 2019-06-10 PROCEDURE — 65270000029 HC RM PRIVATE

## 2019-06-10 PROCEDURE — 74011000258 HC RX REV CODE- 258: Performed by: SURGERY

## 2019-06-10 RX ORDER — ENOXAPARIN SODIUM 100 MG/ML
40 INJECTION SUBCUTANEOUS EVERY 24 HOURS
Status: DISCONTINUED | OUTPATIENT
Start: 2019-06-10 | End: 2019-06-11 | Stop reason: HOSPADM

## 2019-06-10 RX ORDER — LEVOFLOXACIN 750 MG/1
750 TABLET ORAL DAILY
Qty: 7 TAB | Refills: 0 | Status: SHIPPED | OUTPATIENT
Start: 2019-06-10 | End: 2019-06-17

## 2019-06-10 RX ORDER — DOCUSATE SODIUM 100 MG/1
100 CAPSULE, LIQUID FILLED ORAL
Qty: 20 CAP | Refills: 0 | Status: SHIPPED | OUTPATIENT
Start: 2019-06-10

## 2019-06-10 RX ORDER — METRONIDAZOLE 500 MG/1
500 TABLET ORAL 3 TIMES DAILY
Qty: 21 TAB | Refills: 0 | Status: SHIPPED | OUTPATIENT
Start: 2019-06-10 | End: 2019-06-17

## 2019-06-10 RX ORDER — OXYCODONE AND ACETAMINOPHEN 5; 325 MG/1; MG/1
1 TABLET ORAL
Qty: 30 TAB | Refills: 0 | Status: SHIPPED | OUTPATIENT
Start: 2019-06-10 | End: 2019-06-17

## 2019-06-10 RX ORDER — DOCUSATE SODIUM 100 MG/1
100 CAPSULE, LIQUID FILLED ORAL 2 TIMES DAILY
Status: DISCONTINUED | OUTPATIENT
Start: 2019-06-10 | End: 2019-06-11 | Stop reason: HOSPADM

## 2019-06-10 RX ADMIN — OXYCODONE HYDROCHLORIDE AND ACETAMINOPHEN 1 TABLET: 5; 325 TABLET ORAL at 13:52

## 2019-06-10 RX ADMIN — OXYCODONE HYDROCHLORIDE AND ACETAMINOPHEN 1 TABLET: 5; 325 TABLET ORAL at 07:44

## 2019-06-10 RX ADMIN — INSULIN LISPRO 2 UNITS: 100 INJECTION, SOLUTION INTRAVENOUS; SUBCUTANEOUS at 11:48

## 2019-06-10 RX ADMIN — INSULIN LISPRO 2 UNITS: 100 INJECTION, SOLUTION INTRAVENOUS; SUBCUTANEOUS at 17:31

## 2019-06-10 RX ADMIN — PIPERACILLIN AND TAZOBACTAM 3.38 G: 3; .375 INJECTION, POWDER, FOR SOLUTION INTRAVENOUS at 03:10

## 2019-06-10 RX ADMIN — PIPERACILLIN AND TAZOBACTAM 3.38 G: 3; .375 INJECTION, POWDER, FOR SOLUTION INTRAVENOUS at 18:34

## 2019-06-10 RX ADMIN — Medication 10 ML: at 14:16

## 2019-06-10 RX ADMIN — DOCUSATE SODIUM 100 MG: 100 CAPSULE, LIQUID FILLED ORAL at 17:33

## 2019-06-10 RX ADMIN — INSULIN LISPRO 2 UNITS: 100 INJECTION, SOLUTION INTRAVENOUS; SUBCUTANEOUS at 22:28

## 2019-06-10 RX ADMIN — ATORVASTATIN CALCIUM 40 MG: 40 TABLET, FILM COATED ORAL at 09:14

## 2019-06-10 RX ADMIN — OXYCODONE HYDROCHLORIDE AND ACETAMINOPHEN 1 TABLET: 5; 325 TABLET ORAL at 19:07

## 2019-06-10 RX ADMIN — ASPIRIN 81 MG: 81 TABLET ORAL at 09:17

## 2019-06-10 RX ADMIN — TAMSULOSIN HYDROCHLORIDE 0.4 MG: 0.4 CAPSULE ORAL at 09:14

## 2019-06-10 RX ADMIN — INSULIN LISPRO 2 UNITS: 100 INJECTION, SOLUTION INTRAVENOUS; SUBCUTANEOUS at 07:44

## 2019-06-10 RX ADMIN — PIPERACILLIN AND TAZOBACTAM 3.38 G: 3; .375 INJECTION, POWDER, FOR SOLUTION INTRAVENOUS at 10:59

## 2019-06-10 RX ADMIN — ENOXAPARIN SODIUM 40 MG: 40 INJECTION SUBCUTANEOUS at 09:13

## 2019-06-10 RX ADMIN — LISINOPRIL 10 MG: 10 TABLET ORAL at 09:13

## 2019-06-10 NOTE — DISCHARGE INSTRUCTIONS
Patient Discharge Instructions    Deyanira Noyola / [id number] : 1955    Admitted 2019 Discharged: 6/10/2019       LAPAROSCOPIC APPENDECTOMY      FOLLOW-UP:  Please make an appointment with your physician in 10 - 14 day(s). Call your physician immediately if you have any fevers greater than 101.5, drainage from your wound that is not clear or looks infected, persistent bleeding, increasing abdominal pain, problems urinating, or persistent nausea/vomiting. You should be aware that you may have shoulder pain after surgery and that this will progressively go away. This is called 'referred pain' and is from the area of the diaphragm caused by gas that may be trapped under the diaphragm from laparoscopic surgery. This gas will progressively get reabsorbed by your body. WOUND CARE INSTRUCTIONS:   You may shower at home. If clothing rubs against the wound or causes irritation and the wound is not draining you may cover it with a dry dressing during the daytime. Try to keep the wound dry and avoid ointments on the wound unless directed to do so. If the wound becomes bright red and painful or starts to drain infected material that is not clear, please contact your physician immediately. You should also call if you begin to drain fluid that is thin and greenish-brown from the wound and appears to look like bile. If the wound though is mildly pink and has a thick firm ridge underneath it, this is normal, and is referred to as a healing ridge. This will resolve over the next 4-6 weeks. DIET:  You may eat any foods that you can tolerate. It is a good idea to eat a high fiber diet and take in plenty of fluids to prevent constipation. If you do become constipated you may want to take a mild laxative or take ducolax tablets on a daily basis until your bowel habits are regular. Constipation can be very uncomfortable, along with straining, after recent abdominal surgery.     ACTIVITY:  You are encouraged to cough and deep breath or use your incentive spirometer if you were given one, every 15-30 minutes when awake. This will help prevent respiratory complications and low grade fevers post-operatively. You may want to hug a pillow when coughing and sneezing to add additional support to the surgical area(s) which will decrease pain during these times. You are encouraged to walk and engage in light activity for the next two weeks. You should not lift more than 20 pounds during this time frame as it could put you at increased risk for a post-operative hernia. Twenty pounds is roughly equivalent to a plastic bag of groceries. · Most people are able to return to work within 1 to 2 weeks after surgery. · You may shower 24 hours after surgery. Pat the cut (incision) dry. Do not take a bath for the first week. · Your doctor will tell you when you can have sex again. MEDICATIONS:  Try to take narcotic medications and anti-inflammatory medications, such as tylenol, ibuprofen, naprosyn, etc., with food. This will minimize stomach upset from the medication. Should you develop nausea and vomiting from the pain medication, or develop a rash, please discontinue the medication and contact your physician. You should not drive, make important decisions, or operate machinery when taking narcotic pain medication. · It is important that you take the medication exactly as they are prescribed. · Keep your medication in the bottles provided by the pharmacist and keep a list of the medication names, dosages, and times to be taken in your wallet. · Do not take other medications without consulting your doctor. · Take ibuprofen (Motrin) as scheduled then combine with oxycodone/acetaminophen (Percocet, Roxicet, Tylox) as needed for severe pain. QUESTIONS:  Please feel free to call Dr. Americo Joseph office ([phone]) if you have any questions, and they will be glad to assist you.       Follow-up with Dr. Siva Quesada in 2 week(s). Call the office to schedule your appointment. Information obtained by :    I understand that if any problems occur once I am at home I am to contact my physician. I understand and acknowledge receipt of the instructions indicated above.                                                                                                                                            Physician's or R.N.'s Signature                                                                  Date/Time                                                                                                                                              Patient or Representative Signature                                                          Date/Time

## 2019-06-10 NOTE — DIABETES MGMT
[facility]    DTC Progress Note    Recommendations/ Comments: Chart reviewed for hyperglycemia     If appropriate, please consider:  Adding Lantus 16 units daily  Please consider adding an A1C to next blood draw. Current hospital DM medication: Humalog for correction, normal sensitivity scale     Chart reviewed on Addie Ames. Patient is a 71 y.o. male with hx Type 2 Diabetes on Lantus 75 units daily Metformin and Glipizide at home. A1c:   No results found for: HBA1C, HGBE8, [id number]    Recent Glucose Results:   Lab Results   Component Value Date/Time     (H) 06/10/2019 06:38 AM     (H) 06/09/2019 05:45 PM    GLUCPOC 162 (H) 06/10/2019 04:49 PM    GLUCPOC 181 (H) 06/10/2019 11:17 AM    GLUCPOC 173 (H) 06/10/2019 06:52 AM        Lab Results   Component Value Date/Time    Creatinine 0.84 06/10/2019 06:38 AM     Estimated Creatinine Clearance: 111.9 mL/min (based on SCr of 0.84 mg/dL). Active Orders   Diet    DIET DIABETIC CONSISTENT CARB Regular        PO intake: No data found. Will continue to follow as needed.     Thank you    Kayla Alva RD, [address]  Pager: [phone]       Time spent: 8 min

## 2019-06-10 NOTE — BRIEF OP NOTE
BRIEF OPERATIVE NOTE    Date of Procedure: 6/9/2019     Preoperative Diagnosis: ACUTE APPENDICITIS WITH PERFORATION AND LOCALIZED PERITONITIS    Postoperative Diagnosis: ACUTE APPENDICITIS WITH PERFORATION AND LOCALIZED PERITONITIS    Procedure(s):   LAPAROSCOPIC APPENDECTOMY    Surgeon(s) and Role:     Sahil Ng MD - Primary         Surgical Assistant: Dorothea Posada    Surgical Staff:  Circ-1: Sharmin Jacobson RN  Scrub Tech-1: Hawa Khoury  Surg Asst-1: Douglas JACKSON  Event Time In Time Out   Incision Start 2130    Incision Close 2235      Anesthesia: General     Estimated Blood Loss: 15 mL    Specimens:   ID Type Source Tests Collected by Time Destination   1 : Appendix Fresh Appendix  Charley Bello MD 6/9/2019 2221 Pathology      Findings: Acute appendicitis with perforation at tip of appendix with abscess extending into retroperitoneum.        Complications: None    Implants: * No implants in log *

## 2019-06-10 NOTE — ANESTHESIA PREPROCEDURE EVALUATION
Relevant Problems   No relevant active problems       Anesthetic History   No history of anesthetic complications            Review of Systems / Medical History  Patient summary reviewed, nursing notes reviewed and pertinent labs reviewed    Pulmonary  Within defined limits                 Neuro/Psych   Within defined limits           Cardiovascular  Within defined limits                     GI/Hepatic/Renal  Within defined limits              Endo/Other    Diabetes    Obesity     Other Findings              Physical Exam    Airway  Mallampati: II  TM Distance: > 6 cm  Neck ROM: normal range of motion   Mouth opening: Normal     Cardiovascular  Regular rate and rhythm,  S1 and S2 normal,  no murmur, click, rub, or gallop             Dental  No notable dental hx       Pulmonary  Breath sounds clear to auscultation               Abdominal  GI exam deferred       Other Findings            Anesthetic Plan    ASA: 2  Anesthesia type: general          Induction: Intravenous  Anesthetic plan and risks discussed with: Patient

## 2019-06-10 NOTE — H&P
[facility] History and Physical    History of Present Illness:      Elva Thompson is a 71 y.o. male who presents as direct transfer from [facility] with acute appendicitis with localized perforation. The patient states he has had RLQ pain for 2-3 days that has gradually gotten worse. He has had fevers at home up to 101F and no appetite. He denies any n/v.  Last food was a hotdog at 11 am today. He denies any family history or personal history of IBD. He had a colonoscopy at age 47 which he states was normal and has taken the Cologuard test 3 times in the past 3 years which was normal.  He had a CT scan done at [facility] ER that showed acute appendicitis with perforation at the tip and a small collection of fluid around the tip of the appendix. He was noted to have a leukocytosis as well in addition to fever and tachycardia. He was started on zosyn prior to transfer and give IVF boluses.       Past Medical History:   Diagnosis Date    Endocrine disease     Diabetes    Family history of skin cancer     Melanoma in his father    History of actinic keratoses     Sun-damaged skin     Sunburn, blistering        Past Surgical History:   Procedure Laterality Date    HX HIP REPLACEMENT      Left side         Current Facility-Administered Medications:     sodium chloride (NS) flush 5-40 mL, 5-40 mL, IntraVENous, Q8H, Andrew Borges MD    sodium chloride (NS) flush 5-40 mL, 5-40 mL, IntraVENous, PRN, Lena Borges MD    lactated Ringers infusion, 150 mL/hr, IntraVENous, CONTINUOUS, Lena Borges MD, Last Rate: 150 mL/hr at 06/09/19 1931, 150 mL/hr at 06/09/19 1931    HYDROmorphone (PF) (DILAUDID) injection 0.5 mg, 0.5 mg, IntraVENous, Q4H PRN, Kay Reis MD    [facility] injection 4 mg, 4 mg, IntraVENous, Q4H PRN, Kay Reis MD    [START ON 6/10/2019] piperacillin-tazobactam (ZOSYN) 3.375 g in 0.9% sodium chloride (MBP/ADV) 100 mL, 3.375 g, IntraVENous, Q8H, Steven Garcia MD    No Known Allergies    Social History     Socioeconomic History    Marital status:      Spouse name: Not on file    Number of children: Not on file    Years of education: Not on file    Highest education level: Not on file   Occupational History    Not on file   Social Needs    Financial resource strain: Not on file    Food insecurity:     Worry: Not on file     Inability: Not on file    Transportation needs:     Medical: Not on file     Non-medical: Not on file   Tobacco Use    Smoking status: Never Smoker    Smokeless tobacco: Never Used   Substance and Sexual Activity    Alcohol use: No    Drug use: No    Sexual activity: Not on file   Lifestyle    Physical activity:     Days per week: Not on file     Minutes per session: Not on file    Stress: Not on file   Relationships    Social connections:     Talks on phone: Not on file     Gets together: Not on file     Attends [employer] service: Not on file     Active member of club or organization: Not on file     Attends meetings of clubs or organizations: Not on file     Relationship status: Not on file    Intimate partner violence:     Fear of current or ex partner: Not on file     Emotionally abused: Not on file     Physically abused: Not on file     Forced sexual activity: Not on file   Other Topics Concern    Not on file   Social History Narrative    Not on file       Family History   Problem Relation Age of Onset    Cancer Mother     Emphysema Mother     Cancer Father        ROS   Constitutional: negative  Ears, Nose, Mouth, Throat, and Face: negative  Respiratory: negative  Cardiovascular: negative  Gastrointestinal: See HPI  Genitourinary:negative  Integument/Breast: negative  Hematologic/Lymphatic: negative  Behavioral/Psychiatric: negative  Allergic/Immunologic: negative      Physical Exam:     Visit Vitals  /64   Pulse (!) 102   Temp 99.9 °F (37.7 °C)   Resp 25   Ht 6' (1.829 m)   Wt 240 lb 8.4 oz (109.1 kg)   SpO2 96%   BMI 32.62 kg/m²       General - alert and oriented, no apparent distress  HEENT - NC/AT. No scleral icterus  Pulm - CTAB, normal inspiratory effort  CV - RRR, tachycardic  Abd - soft, ND.  TTP in RLQ. No rebound or guarding. No palpable masses or organomegaly. Ext - warm, well perfused, no edema  Skin - supple, no rashes  Psychiatric - normal affect, good mood    Labs  Recent Results (from the past 24 hour(s))   METABOLIC PANEL, COMPREHENSIVE    Collection Time: 06/09/19  5:45 PM   Result Value Ref Range    Sodium 135 (L) 136 - 145 mmol/L    Potassium 3.8 3.5 - 5.1 mmol/L    Chloride 97 97 - 108 mmol/L    CO2 21 21 - 32 mmol/L    Anion gap 17 (H) 5 - 15 mmol/L    Glucose 185 (H) 65 - 100 mg/dL    BUN 19 6 - 20 MG/DL    Creatinine 1.06 0.70 - 1.30 MG/DL    BUN/Creatinine ratio 18 12 - 20      GFR est AA >60 >60 ml/min/1.73m2    GFR est non-AA >60 >60 ml/min/1.73m2    Calcium 9.1 8.5 - 10.1 MG/DL    Bilirubin, total 2.4 (H) 0.2 - 1.0 MG/DL    ALT (SGPT) 21 12 - 78 U/L    AST (SGOT) 13 (L) 15 - 37 U/L    Alk. phosphatase 66 45 - 117 U/L    Protein, total 7.2 6.4 - 8.2 g/dL    Albumin 3.3 (L) 3.5 - 5.0 g/dL    Globulin 3.9 2.0 - 4.0 g/dL    A-G Ratio 0.8 (L) 1.1 - 2.2     CBC WITH AUTOMATED DIFF    Collection Time: 06/09/19  5:45 PM   Result Value Ref Range    WBC 15.2 (H) 4.1 - 11.1 K/uL    RBC 5.13 4.10 - 5.70 M/uL    HGB 13.8 12.1 - 17.0 g/dL    HCT 41.4 36.6 - 50.3 %    MCV 80.7 80.0 - 99.0 FL    MCH 26.9 26.0 - 34.0 PG    MCHC 33.3 30.0 - 36.5 g/dL    RDW 15.0 (H) 11.5 - 14.5 %    PLATELET 630 324 - 397 K/uL    MPV 8.8 (L) 8.9 - 12.9 FL    NRBC 0.0 0  WBC    ABSOLUTE NRBC 0.00 0.00 - 0.01 K/uL    NEUTROPHILS 80 (H) 32 - 75 %    LYMPHOCYTES 10 (L) 12 - 49 %    MONOCYTES 9 5 - 13 %    EOSINOPHILS 0 0 - 7 %    BASOPHILS 0 0 - 1 %    IMMATURE GRANULOCYTES 1 (H) 0.0 - 0.5 %    ABS. NEUTROPHILS 12.2 (H) 1.8 - 8.0 K/UL    ABS. LYMPHOCYTES 1.4 0.8 - 3.5 K/UL    ABS.  MONOCYTES 1.4 (H) 0.0 - 1.0 K/UL    ABS. EOSINOPHILS 0.0 0.0 - 0.4 K/UL    ABS. BASOPHILS 0.1 0.0 - 0.1 K/UL    ABS. IMM. GRANS. 0.1 (H) 0.00 - 0.04 K/UL    DF AUTOMATED     SAMPLES BEING HELD    Collection Time: 06/09/19  5:45 PM   Result Value Ref Range    SAMPLES BEING HELD 1RED 1BLUE     COMMENT        Add-on orders for these samples will be processed based on acceptable specimen integrity and analyte stability, which may vary by analyte. URINALYSIS W/MICROSCOPIC    Collection Time: 06/09/19  5:45 PM   Result Value Ref Range    Color DARK YELLOW      Appearance CLEAR CLEAR      Specific gravity 1.025 1.003 - 1.030      pH (UA) 6.0 5.0 - 8.0      Protein 100 (A) NEG mg/dL    Glucose 250 (A) NEG mg/dL    Ketone >80 (A) NEG mg/dL    Blood TRACE (A) NEG      Urobilinogen 1.0 0.2 - 1.0 EU/dL    Nitrites NEGATIVE  NEG      Leukocyte Esterase NEGATIVE  NEG      WBC 0-4 0 - 4 /hpf    RBC 0-5 0 - 5 /hpf    Epithelial cells FEW FEW /lpf    Bacteria NEGATIVE  NEG /hpf    Mucus 2+ (A) NEG /lpf    Granular cast 0-2 (A) NEG /lpf   URINE CULTURE HOLD SAMPLE    Collection Time: 06/09/19  5:45 PM   Result Value Ref Range    Urine culture hold        URINE ON HOLD IN MICROBIOLOGY DEPT FOR 3 DAYS. IF UNPRESERVED URINE IS SUBMITTED, IT CANNOT BE USED FOR ADDITIONAL TESTING AFTER 24 HRS, RECOLLECTION WILL BE REQUIRED.    BILIRUBIN, CONFIRM    Collection Time: 06/09/19  5:45 PM   Result Value Ref Range    Bilirubin UA, confirm NEGATIVE  NEG     EKG, 12 LEAD, INITIAL    Collection Time: 06/09/19  5:47 PM   Result Value Ref Range    Ventricular Rate 123 BPM    Atrial Rate 123 BPM    P-R Interval 126 ms    QRS Duration 86 ms    Q-T Interval 296 ms    QTC Calculation (Bezet) 423 ms    Calculated P Axis 40 degrees    Calculated R Axis 70 degrees    Calculated T Axis -23 degrees    Diagnosis       Sinus tachycardia with premature atrial complexes  ST & T wave abnormality, consider inferolateral ischemia  Abnormal ECG  No previous ECGs available     LACTIC ACID    Collection Time: 06/09/19  5:50 PM   Result Value Ref Range    Lactic acid 1.1 0.4 - 2.0 MMOL/L       Imaging  6/9/19 CT A/P: FINDINGS:   LUNG BASES: Clear. INCIDENTALLY IMAGED HEART AND MEDIASTINUM: Unremarkable. LIVER: No mass or biliary dilatation. GALLBLADDER: Unremarkable. SPLEEN: No mass. PANCREAS: No mass or ductal dilatation. ADRENALS: Unremarkable. KIDNEYS/URETERS: No mass, calculus, or hydronephrosis. STOMACH: Unremarkable. SMALL BOWEL: No dilatation or wall thickening. COLON: No dilatation or wall thickening. APPENDIX: The appendix is distended, measuring 11 mm. At the tip of the  appendix, there is a 2.8 cm fluid collection, and localized extraluminal air is  noted. PERITONEUM: No ascites or pneumoperitoneum. RETROPERITONEUM: No lymphadenopathy or aortic aneurysm. REPRODUCTIVE ORGANS: The prostate is enlarged, measuring 5.7 cm. URINARY BLADDER: No mass or calculus. BONES: The patient is status post left total hip replacement. ADDITIONAL COMMENTS: N/A     IMPRESSION  IMPRESSION:  Findings compatible with perforated appendicitis with small fluid collection at  the tip and extraluminal air bubbles. I have reviewed and agree with all of the pertinent images    Assessment:     Abelardo Vargas is a 71 y.o. male with acute appendicitis with perforation that appears localized to the tip of the appendix. Recommendations:     1. I have discussion options of non-operative vs operative management. I discussed with surgery that we would attempt to remove the appendix but if the area appeared too inflamed for this to be safe, I would just wash this out and leave a drain rather than remove the appendix. He was in agreement with a surgical approach. A complete discussion of the risks, benefits and alternatives to surgery were discussed with the patient who was keen to proceed. NPO for surgery, IV antibiotics, IVF.         Alexandr Angel MD  6/9/2019  9:02 PM

## 2019-06-10 NOTE — PERIOP NOTES
2035 Pt arrived via stretcher from transport team, holding urinal with 200 cc and noting he has to void, walked to restroom where he voided. 2044 Dr Darryl Herrera at bedside reviewing procedure with patient, asking pertinent questions, assessing pt, and signing consent. 2050 Dr Huma Ray at bedside. Blood sugar checked 185.

## 2019-06-10 NOTE — ANESTHESIA POSTPROCEDURE EVALUATION
Procedure(s):  APPENDECTOMY LAPAROSCOPIC. general    Anesthesia Post Evaluation        Patient location during evaluation: PACU  Patient participation: complete - patient participated  Level of consciousness: awake  Pain management: adequate  Airway patency: patent  Anesthetic complications: no  Cardiovascular status: hemodynamically stable  Respiratory status: acceptable  Hydration status: acceptable  Comments: I have seen and evaluated the patient. The patient is ready for PACU discharge. [address], DO                         Vitals Value Taken Time   /57 6/9/2019 11:00 PM   Temp 36.6 °C (97.9 °F) 6/9/2019 11:00 PM   Pulse 94 6/9/2019 11:04 PM   Resp 18 6/9/2019 11:04 PM   SpO2 96 % 6/9/2019 11:04 PM   Vitals shown include unvalidated device data.

## 2019-06-10 NOTE — PROGRESS NOTES
Care Management Interventions  PCP Verified by CM: Yes  Palliative Care Criteria Met (RRAT>21 & CHF Dx)?: No  Mode of Transport at Discharge: Self  Transition of Care Consult (CM Consult): Other(no needs identified)  MyChart Signup: No  Discharge Durable Medical Equipment: No  Health Maintenance Reviewed: Yes  Physical Therapy Consult: No  Occupational Therapy Consult: No  Speech Therapy Consult: No  Current Support Network: Lives with Spouse  Confirm Follow Up Transport: Family  Plan discussed with Pt/Family/Caregiver: Yes  Freedom of Choice Offered: Yes  [place] Resource Information Provided?: No  Discharge Location  Discharge Placement: Home    Reason for Admission:   Appendectomy, Laparoscopic                    RRAT Score: 5                    Plan for utilizing home health:  None determined                        Current Advanced Directive/Advance Care Plan: Full/Not on File    Likelihood of Readmission:  low                         Transition of Care Plan:      Post op day #1 for Appendectomy. No care management needs identified at this time.  ROSIE

## 2019-06-10 NOTE — ED NOTES
TRANSFER - OUT REPORT:    Verbal report given to REYNALDO Stanley (name) on Abrahan Orellana  being transferred to ED (unit) for routine progression of care       Report consisted of patients Situation, Background, Assessment and   Recommendations(SBAR). Information from the following report(s) SBAR and ED Summary was reviewed with the receiving nurse. Lines:   Peripheral IV 06/09/19 Right Wrist (Active)   Site Assessment Clean, dry, & intact 6/9/2019  5:57 PM   Phlebitis Assessment 0 6/9/2019  5:57 PM   Infiltration Assessment 0 6/9/2019  5:57 PM   Dressing Status Clean, dry, & intact 6/9/2019  5:57 PM   Dressing Type Transparent 6/9/2019  5:57 PM   Hub Color/Line Status Pink;Flushed;Patent 6/9/2019  5:57 PM   Action Taken Blood drawn 6/9/2019  5:57 PM        Opportunity for questions and clarification was provided. Patient transported with:   Monitor     UPDATE: Pt assessment unchanged, no complaints, VSS, no acute distress at time of discharge.

## 2019-06-10 NOTE — PROGRESS NOTES
[facility]    POD #1 s/p laparoscopic appendectomy    Subjective     No acute events overnight. Pain improving. He denies n/v but feels bloated. No flatus or stool yet. Ambulating. Drain serosanguinous.       Objective     Patient Vitals for the past 24 hrs:   Temp Pulse Resp BP SpO2   06/10/19 0309 97.8 °F (36.6 °C) 94 16 105/58 98 %   06/10/19 0145 97.7 °F (36.5 °C) 90 16 100/64 100 %   06/10/19 0040 97.9 °F (36.6 °C) 96 16 105/61 100 %   06/09/19 2324 98.1 °F (36.7 °C) 92 16 121/66 98 %   06/09/19 2300 97.9 °F (36.6 °C) 93 15 113/57 96 %   06/09/19 2255  92 18 114/58 99 %   06/09/19 2250 98.5 °F (36.9 °C) 94 17 117/58 99 %   06/09/19 2246 98.5 °F (36.9 °C) 95 14 111/58 97 %   06/09/19 2245  95 17 111/59 96 %   06/09/19 2240  92 11 111/58 (!) 89 %   06/09/19 2105  (!) 108 17  95 %   06/09/19 2104  (!) 106 25  94 %   06/09/19 2103  (!) 107 23  94 %   06/09/19 2102  (!) 106 21  94 %   06/09/19 2101  (!) 106 21  95 %   06/09/19 2100  (!) 106 20 141/65 95 %   06/09/19 2055  (!) 108 15 155/67 96 %   06/09/19 2050    146/73    06/09/19 2045    148/72    06/09/19 2044    157/77    06/09/19 2040 98.1 °F (36.7 °C)       06/09/19 2000  (!) 102 25 132/64 96 %   06/09/19 1900  (!) 104 26 138/64 95 %   06/09/19 1856 99.9 °F (37.7 °C) (!) 106 (!) 32 139/69 94 %   06/09/19 1830  (!) 115 (!) 38 143/68 95 %   06/09/19 1800  (!) 121 30 152/62 95 %   06/09/19 1743 (!) 101.5 °F (38.6 °C) (!) 126 20 149/66 94 %       Date 06/09/19 0700 - 06/10/19 0659 06/10/19 0700 - 06/11/19 0659   Shift 6964-7654 1441-2209 24 Hour Total 5980-4081 9592-6200 24 Hour Total   INTAKE   I.V.(mL/kg/hr)  3550(2.7) 3550(1.4)        Volume (lactated Ringers infusion)  1800 1800        Volume (sodium chloride 0.9 % bolus infusion 1,000 mL)  1650 1650        Volume (piperacillin-tazobactam (ZOSYN) 3.375 g in 0.9% sodium chloride (MBP/ADV) 100 mL)  100 100      Shift Total(mL/kg)  3550(32.5) 3550(32.5)      OUTPUT   Urine(mL/kg/hr)  600(0.5) 600(0.2) 200  200     Urine Voided  600 600 200  200     Urine Occurrence(s)  3 x 3 x      Drains  55 55 15  15     Output (ml) (Nancy Núñez #1 06/09/19 Left; Lower Abdomen)  55 55 15  15   Shift Total(mL/kg)  655(6) 655(6) 215(2)  215(2)   NET  2895 2895 -215  -215   Weight (kg) 109.1 109.1 109.1 109.1 109.1 109.1       PE  GEN - Awake, alert, communicating appropriately. NAD  Pulm - CTAB  CV - RRR  Abd - soft, distended, appropriately tender. Incisions c/d/i.  TAMMIE drain with serosanguinous output. Ext - warm, well perfused. Labs  Recent Results (from the past 24 hour(s))   METABOLIC PANEL, COMPREHENSIVE    Collection Time: 06/09/19  5:45 PM   Result Value Ref Range    Sodium 135 (L) 136 - 145 mmol/L    Potassium 3.8 3.5 - 5.1 mmol/L    Chloride 97 97 - 108 mmol/L    CO2 21 21 - 32 mmol/L    Anion gap 17 (H) 5 - 15 mmol/L    Glucose 185 (H) 65 - 100 mg/dL    BUN 19 6 - 20 MG/DL    Creatinine 1.06 0.70 - 1.30 MG/DL    BUN/Creatinine ratio 18 12 - 20      GFR est AA >60 >60 ml/min/1.73m2    GFR est non-AA >60 >60 ml/min/1.73m2    Calcium 9.1 8.5 - 10.1 MG/DL    Bilirubin, total 2.4 (H) 0.2 - 1.0 MG/DL    ALT (SGPT) 21 12 - 78 U/L    AST (SGOT) 13 (L) 15 - 37 U/L    Alk.  phosphatase 66 45 - 117 U/L    Protein, total 7.2 6.4 - 8.2 g/dL    Albumin 3.3 (L) 3.5 - 5.0 g/dL    Globulin 3.9 2.0 - 4.0 g/dL    A-G Ratio 0.8 (L) 1.1 - 2.2     CBC WITH AUTOMATED DIFF    Collection Time: 06/09/19  5:45 PM   Result Value Ref Range    WBC 15.2 (H) 4.1 - 11.1 K/uL    RBC 5.13 4.10 - 5.70 M/uL    HGB 13.8 12.1 - 17.0 g/dL    HCT 41.4 36.6 - 50.3 %    MCV 80.7 80.0 - 99.0 FL    MCH 26.9 26.0 - 34.0 PG    MCHC 33.3 30.0 - 36.5 g/dL    RDW 15.0 (H) 11.5 - 14.5 %    PLATELET 920 246 - 156 K/uL    MPV 8.8 (L) 8.9 - 12.9 FL    NRBC 0.0 0  WBC    ABSOLUTE NRBC 0.00 0.00 - 0.01 K/uL    NEUTROPHILS 80 (H) 32 - 75 %    LYMPHOCYTES 10 (L) 12 - 49 %    MONOCYTES 9 5 - 13 %    EOSINOPHILS 0 0 - 7 %    BASOPHILS 0 0 - 1 %    IMMATURE GRANULOCYTES 1 (H) 0.0 - 0.5 %    ABS. NEUTROPHILS 12.2 (H) 1.8 - 8.0 K/UL    ABS. LYMPHOCYTES 1.4 0.8 - 3.5 K/UL    ABS. MONOCYTES 1.4 (H) 0.0 - 1.0 K/UL    ABS. EOSINOPHILS 0.0 0.0 - 0.4 K/UL    ABS. BASOPHILS 0.1 0.0 - 0.1 K/UL    ABS. IMM. GRANS. 0.1 (H) 0.00 - 0.04 K/UL    DF AUTOMATED     CULTURE, BLOOD, PAIRED    Collection Time: 06/09/19  5:45 PM   Result Value Ref Range    Special Requests: NO SPECIAL REQUESTS      Culture result: NO GROWTH AFTER 5 HOURS     SAMPLES BEING HELD    Collection Time: 06/09/19  5:45 PM   Result Value Ref Range    SAMPLES BEING HELD 1RED 1BLUE     COMMENT        Add-on orders for these samples will be processed based on acceptable specimen integrity and analyte stability, which may vary by analyte. URINALYSIS W/MICROSCOPIC    Collection Time: 06/09/19  5:45 PM   Result Value Ref Range    Color DARK YELLOW      Appearance CLEAR CLEAR      Specific gravity 1.025 1.003 - 1.030      pH (UA) 6.0 5.0 - 8.0      Protein 100 (A) NEG mg/dL    Glucose 250 (A) NEG mg/dL    Ketone >80 (A) NEG mg/dL    Blood TRACE (A) NEG      Urobilinogen 1.0 0.2 - 1.0 EU/dL    Nitrites NEGATIVE  NEG      Leukocyte Esterase NEGATIVE  NEG      WBC 0-4 0 - 4 /hpf    RBC 0-5 0 - 5 /hpf    Epithelial cells FEW FEW /lpf    Bacteria NEGATIVE  NEG /hpf    Mucus 2+ (A) NEG /lpf    Granular cast 0-2 (A) NEG /lpf   URINE CULTURE HOLD SAMPLE    Collection Time: 06/09/19  5:45 PM   Result Value Ref Range    Urine culture hold        URINE ON HOLD IN MICROBIOLOGY DEPT FOR 3 DAYS. IF UNPRESERVED URINE IS SUBMITTED, IT CANNOT BE USED FOR ADDITIONAL TESTING AFTER 24 HRS, RECOLLECTION WILL BE REQUIRED.    BILIRUBIN, CONFIRM    Collection Time: 06/09/19  5:45 PM   Result Value Ref Range    Bilirubin UA, confirm NEGATIVE  NEG     EKG, 12 LEAD, INITIAL    Collection Time: 06/09/19  5:47 PM   Result Value Ref Range    Ventricular Rate 123 BPM    Atrial Rate 123 BPM    P-R Interval 126 ms    QRS Duration 86 ms    Q-T Interval 296 ms    QTC Calculation (Bezet) 423 ms    Calculated P Axis 40 degrees    Calculated R Axis 70 degrees    Calculated T Axis -23 degrees    Diagnosis       Sinus tachycardia with premature atrial complexes  ST & T wave abnormality, consider inferolateral ischemia  No previous ECGs available  Confirmed by Chantelle Worthy M.D., Cody Candelaria ([id number]) on 6/10/2019 8:28:15 AM     LACTIC ACID    Collection Time: 06/09/19  5:50 PM   Result Value Ref Range    Lactic acid 1.1 0.4 - 2.0 MMOL/L   GLUCOSE, POC    Collection Time: 06/09/19  9:06 PM   Result Value Ref Range    Glucose (POC) 186 (H) 65 - 100 mg/dL    Performed by Kiera Burch, POC    Collection Time: 06/09/19 10:47 PM   Result Value Ref Range    Glucose (POC) 189 (H) 65 - 100 mg/dL    Performed by [address] [place] [address],[address], BASIC    Collection Time: 06/10/19  6:38 AM   Result Value Ref Range    Sodium 138 136 - 145 mmol/L    Potassium 4.2 3.5 - 5.1 mmol/L    Chloride 106 97 - 108 mmol/L    CO2 26 21 - 32 mmol/L    Anion gap 6 5 - 15 mmol/L    Glucose 179 (H) 65 - 100 mg/dL    BUN 15 6 - 20 MG/DL    Creatinine 0.84 0.70 - 1.30 MG/DL    BUN/Creatinine ratio 18 12 - 20      GFR est AA >60 >60 ml/min/1.73m2    GFR est non-AA >60 >60 ml/min/1.73m2    Calcium 8.6 8.5 - 10.1 MG/DL   CBC W/O DIFF    Collection Time: 06/10/19  6:38 AM   Result Value Ref Range    WBC 10.6 4.1 - 11.1 K/uL    RBC 4.61 4.10 - 5.70 M/uL    HGB 12.4 12.1 - 17.0 g/dL    HCT 38.9 36.6 - 50.3 %    MCV 84.4 80.0 - 99.0 FL    MCH 26.9 26.0 - 34.0 PG    MCHC 31.9 30.0 - 36.5 g/dL    RDW 15.3 (H) 11.5 - 14.5 %    PLATELET 886 433 - 037 K/uL    MPV 9.3 8.9 - 12.9 FL    NRBC 0.0 0  WBC    ABSOLUTE NRBC 0.00 0.00 - 0.01 K/uL   GLUCOSE, POC    Collection Time: 06/10/19  6:52 AM   Result Value Ref Range    Glucose (POC) 173 (H) 65 - 100 mg/dL    Performed by Nazario De Los Santos is a 71 y. o.yr old male with acute appendicitis with perforation and localized peritonitis. Plan     - Continue diabetic diet. Will follow bloating.      - Will keep on IV antibiotics while in the hospital due to his perforation and abscess. Will do PO antibiotics for 1 week when discharged home. - Plan to remove drain prior to discharge. - Encourage ambulation  - Sliding scale insulin for DM  - Lovenox for DVT proph  - May need 1 more night in hospital if bloating doesn't improve. Will reassess this afternoon.       rOquidea Olson MD  6/10/2019  8:39 AM

## 2019-06-10 NOTE — ROUTINE PROCESS
Bedside shift change report given to Jermain ParsonRN (oncoming nurse) by Antony Duckworth RN(offgoing nurse). Report given with SBAR.

## 2019-06-10 NOTE — PERIOP NOTES
2305 VS stable. Awake and alert. Resting comfortably. Patient denies nausea. Pain improved. No signs of excessive bleeding. Ready to transfer from PACU.

## 2019-06-10 NOTE — PERIOP NOTES
TRANSFER - OUT REPORT:    Verbal report given to Jerri(name) on Aamir Villa  being transferred to [facility](unit) for routine post - op       Report consisted of patients Situation, Background, Assessment and   Recommendations(SBAR). Information from the following report(s) SBAR, OR Summary, Intake/Output, MAR, Recent Results and Cardiac Rhythm NSR. was reviewed with the receiving nurse. Opportunity for questions and clarification was provided. Is the patient on 02? NO    Is the patient on a monitor? NO    Is the nurse transporting with the patient? YES    Surgical Waiting Area notified of patient's transfer from PACU?  YES      The following personal items collected during your admission accompanied patient upon transfer:   Dental Appliance: Dental Appliances: None  Vision: Visual Aid: Glasses  Hearing Aid:    Jewelry:    Clothing: Clothing: None  Other Valuables:    Valuables sent to safe:

## 2019-06-10 NOTE — PROGRESS NOTES
TRANSFER - IN REPORT:    Verbal report received from [address] [place] [address] RN(name) on Chacho Hager  being received from PACU(unit) for routine post - op      Report consisted of patients Situation, Background, Assessment and   Recommendations(SBAR). Information from the following report(s) OR Summary was reviewed with the receiving nurse. Opportunity for questions and clarification was provided. Assessment completed upon patients arrival to unit and care assumed.

## 2019-06-10 NOTE — OP NOTES
[address]   OPERATIVE REPORT    Name:  Gordon Washington  MR#:  [id number]  :  1955  ACCOUNT #:  [de-identified]  DATE OF SERVICE:  2019    DATE OF PROCEDURE:  2019    PREOPERATIVE DIAGNOSIS:  Acute appendicitis with perforation and localized peritonitis. POSTOPERATIVE DIAGNOSIS:  Acute appendicitis with perforation and localized peritonitis. PROCEDURE PERFORMED:  Laparoscopic appendectomy. SURGEON:  Garrick Boregs MD    SURGICAL ASSISTANT:  Kenya Gunn. ANESTHESIA:  General.    COMPLICATIONS:  None. SPECIMENS REMOVED:  Appendix. DISPOSITION OF SPECIMEN:  To Pathology. IMPLANTS:  None. ESTIMATED BLOOD LOSS:  15 mL. DISPOSITION:  To PACU. FINDINGS:  The patient had acute appendicitis with perforation at the tip of the appendix with an abscess extending into the retroperitoneum. The base of the appendix at the cecum appeared healthy. INDICATIONS:  The patient is a 71-year-old gentleman who presented with a 3-day history of right lower quadrant abdominal pain that had progressed over this time as well as fevers. He underwent a workup in the emergency room and was found to have leukocytosis and CT imaging suggesting acute appendicitis with a small perforation at the tip of the appendix with a contained area of fluid and gas. The patient was tachycardic and showing a SIRS response, and thus I discussed with him the options of surgical exploration for possible appendectomy versus nonoperative management with antibiotics. He was in agreement to proceed with surgical exploration. A complete discussion of risks, benefits and alternatives of surgery was held with the patient and he was in agreement to proceed. Informed consent was obtained. DESCRIPTION OF THE OPERATION:  After informed consent was obtained, the patient was brought back to the operating room.   He was placed under general endotracheal anesthesia in the supine position on the operating room table. His left arm was tucked. He was prepped and draped in the usual sterile fashion. A proper time-out was performed. With this completed, we injected local anesthetic into the skin and subcutaneous tissue just above the umbilicus. A 5-mm incision was made. We dissected down to the base of the umbilicus using a Kocher clamp. This was grasped and retracted anteriorly. I tried to pass a Veress needle into the abdomen at this site, but had difficulty getting an appropriate water drop technique test to take place, and thus I placed a Veress needle in the left upper quadrant in the midclavicular line just beneath the costal margin. Here a standard water drop technique test was performed without difficulty and the abdomen was insufflated to 15 mmHg. I then tunneled into the abdomen using a 5-mm Optiview trocar at the initial incision site just above the umbilicus. The abdomen was entered safely and under direct visualization. I inspected and there was no evidence of any injury here or at the Veress needle site in the left upper quadrant. The Veress needle was removed. We then placed additional trocars including a left lower quadrant 12-mm trocar and a 5-mm suprapubic trocar. These were placed after injection of local anesthetic and under direct visualization. We then placed the patient in Trendelenburg position with the right side up, identified the cecum and was able to sweep this medially exposing the terminal ileum. The ileal antimesenteric fat of the terminal ileum was inflamed, but in pulling this medially, I was able to separate it from the appendix. The appendix was obviously inflamed; however, the base of the appendix appeared to be healthy. Examination of the remainder of the appendix showed that there was perforation at the tip with a localized abscess.   I broke into this abscess using a suction  and suctioned out purulent material.  This was extending into the retroperitoneum as well. Using a combination of blunt dissection as well as a dissection with a laparoscopic LigaSure device, I was able to mobilize the tail of the appendix away from the retroperitoneum. At this point, I then created a window in the mesoappendix adjacent to the cecum using careful dissection with the LigaSure device without any energy. Once I was able to make this window, I then stapled the base of the appendix using a 30 mm tan Endo-ANA ROSA staple load. I then divided the remainder of the mesoappendix using the LigaSure device. The appendix was  free from the cecum and passed into an Endo Catch bag and removed through the left lower quadrant 12-mm trocar site. We copiously irrigated and observed the area of our resection and this was nicely hemostatic. There was no areas of gross purulence outside of the operative site. At this time, I decided to place a drain, which was extended through the left lower quadrant 12-mm trocar site across the abdomen and was left in the operative bed of the appendix. Again, I examined the staple line and this appeared to be nicely closed and healthy. There was no evidence of any bleeding. I secured the drain to the skin using a 2-0 nylon suture. I then used a 0 Vicryl suture to partially close the 12-mm trocar site making sure that it was loose enough for the drain to be removed ultimately. This was done using a suture passer with an 0 Vicryl suture. Once this was completed, the abdomen was allowed to desufflate. The remaining two 5 mm trocars were removed. Additional local anesthetic was injected at all incision sites. I partially closed the incision around the drain site using an interrupted 3-0 Vicryl suture to reapproximate the deeper dermis followed by 4-0 Monocryl subcuticular stitch to close the remaining portion of the  incision adjacent to the drain.   The other two 5-mm trocar sites were closed using a 4-0 Monocryl subcuticular stitch followed by Dermabond skin adhesive. We connected the drain to bulb suction. The patient was then awakened, extubated and taken to the postanesthesia care unit in stable condition. All needle and instrument counts were correct at the completion of the case. I was present and scrubbed throughout the entirety of case. There were no immediate complications.         Patrick Lorenzana MD      [id number]  D:  06/09/2019 22:52  T:  06/09/2019 22:56  JOB #:  [id number]

## 2019-06-11 VITALS
WEIGHT: 240.52 LBS | BODY MASS INDEX: 32.58 KG/M2 | OXYGEN SATURATION: 96 % | HEART RATE: 99 BPM | RESPIRATION RATE: 18 BRPM | DIASTOLIC BLOOD PRESSURE: 80 MMHG | TEMPERATURE: 97.5 F | HEIGHT: 72 IN | SYSTOLIC BLOOD PRESSURE: 148 MMHG

## 2019-06-11 LAB
GLUCOSE BLD STRIP.AUTO-MCNC: 205 MG/DL (ref 65–100)
SERVICE CMNT-IMP: ABNORMAL

## 2019-06-11 PROCEDURE — 82962 GLUCOSE BLOOD TEST: CPT

## 2019-06-11 PROCEDURE — 74011250636 HC RX REV CODE- 250/636: Performed by: SURGERY

## 2019-06-11 PROCEDURE — 74011636637 HC RX REV CODE- 636/637: Performed by: SURGERY

## 2019-06-11 PROCEDURE — 74011000258 HC RX REV CODE- 258: Performed by: SURGERY

## 2019-06-11 RX ADMIN — PIPERACILLIN AND TAZOBACTAM 3.38 G: 3; .375 INJECTION, POWDER, FOR SOLUTION INTRAVENOUS at 02:51

## 2019-06-11 RX ADMIN — INSULIN LISPRO 3 UNITS: 100 INJECTION, SOLUTION INTRAVENOUS; SUBCUTANEOUS at 07:48

## 2019-06-11 RX ADMIN — Medication 10 ML: at 07:48

## 2019-06-11 NOTE — PROGRESS NOTES
Bedside shift change report given to Heber (oncoming nurse) by Debby Rutledge (offgoing nurse). Report included the following information SBAR, Kardex, Intake/Output and MAR.

## 2019-06-11 NOTE — PROGRESS NOTES
[facility]    POD #2 s/p laparoscopic appendectomy    Subjective     No acute events overnight. Starting passing copious flatus. No BM yet. Feels much better today. Less bloated. Tolerating diet. No n/v.   Pain improving. TAMMIE drain remains serosanguinous. Objective     Patient Vitals for the past 24 hrs:   Temp Pulse Resp BP SpO2   06/11/19 0242 97.5 °F (36.4 °C) 99 18 148/80 96 %   06/10/19 2000 99.1 °F (37.3 °C) 99 16 154/63 94 %       Date 06/10/19 0700 - 06/11/19 0659 06/11/19 0700 - 06/12/19 0659(Discharged)   Shift 7373-8477 5235-2540 24 Hour Total 8616-9613 7489-6084 24 Hour Total   INTAKE   I.V.(mL/kg/hr)  675(0.5) 675(0.3)        I.V.  675 675      Shift Total(mL/kg)  675(6.2) 675(6.2)      OUTPUT   Urine(mL/kg/hr) 375(0.3) 325(0.2) 700(0.3)        Urine Voided 375 325 700        Urine Occurrence(s) 2 x  2 x      Drains 40 30 70        Output (ml) ([REMOVED] Dub Munch #1 06/09/19 Left; Lower Abdomen) 40 30 70      Shift Total(mL/kg) 415(3.8) 355(3.3) 770(7.1)      NET -415 320 -95      Weight (kg) 109.1 109.1 109.1 109.1 109.1 109.1       PE  GEN - Awake, alert, communicating appropriately. NAD  Pulm - CTAB  CV - RRR  Abd - soft, distended, appropriately tender. Incisions c/d/i.  TAMMIE drain with serosanguinous output. Ext - warm, well perfused. Labs  Recent Results (from the past 24 hour(s))   GLUCOSE, POC    Collection Time: 06/10/19  9:34 PM   Result Value Ref Range    Glucose (POC) 230 (H) 65 - 100 mg/dL    Performed by Jasmin Bledsoe, POC    Collection Time: 06/11/19  6:30 AM   Result Value Ref Range    Glucose (POC) 205 (H) 65 - 100 mg/dL    Performed by Conchita Bowser is a 71 y. o.yr old male with acute appendicitis with perforation and localized peritonitis.       Plan     - d/c TAMMIE drain  - d/c home today  - 7 more days of PO antibiotics  - f/u in 2 weeks    Pranay Ordoñez MD Katerina  6/11/2019  5:06 PM

## 2019-06-11 NOTE — PROGRESS NOTES
Bedside shift change report given to REYNALDO Yen (oncoming nurse) by Maddy Lao RN (offgoing nurse). Report included the following information SBAR, Kardex, Intake/Output, MAR, Recent Results and Med Rec Status.

## 2019-06-11 NOTE — DISCHARGE SUMMARY
Physician Discharge Summary     Patient ID:  Jero Rodriguez  [id number]  71 y.o.  1955    Admit Date: 6/9/2019    Discharge Date: 6/11/2019    Admission Diagnoses: Acute appendicitis with perforation and localized peritonitis [K35.32]    Discharge Diagnoses:  Principal Problem:    Acute appendicitis with perforation and localized peritonitis (6/9/2019)       Admission Condition: Poor    Discharge Condition: Good    Procedure(s):    6/9/2019 - Procedure(s):  [address] Course: The patient was admitted from the ER with 3 day history of RLQ abdominal pain, fevers and nausea. He was noted on CT to have acute appendicitis with a localized perforation at the tip of the appendix and a small abscess. He was taken to the OR that evening and had a laparoscopic appendectomy with washout of his abscess and drain placement. He did well post-operatively and was discharged home on POD#2 once tolerating a diet, passing flatus, having appropriate pain control with PO meds and ambulating. His drain was removed prior to discharge. He will complete a course of outpatient antibiotics due to the finding of perforation with abscess. Consults: None    Significant Diagnostic Studies: CT A/P showing findings as above. Disposition: home    Patient Instructions:   Cannot display discharge medications since this patient is not currently admitted. Activity: No heavy lifting or strenuous activity for 2 weeks.     Diet: Regular Diet  Wound Care: None needed    Follow-up with Mami Osullivan MD in 2 week(s)  Follow-up tests/labs - None    Signed:  Mami Osullivan MD  6/11/2019  5:07 PM

## 2019-06-11 NOTE — PROGRESS NOTES
Hospital follow-up PCP transitional care appointment has been scheduled with Dr. Michael Wellington for Wednesday, 6/19/19 at 12:15 p.m. Pending patient discharge.   Shaina Dobbins, Care Management Specialist.

## 2019-06-11 NOTE — PROGRESS NOTES
Problem: Falls - Risk of  Goal: *Absence of Falls  Description  Document Diallo Mckenzie Fall Risk and appropriate interventions in the flowsheet.   Outcome: Progressing Towards Goal     Problem: Patient Education: Go to Patient Education Activity  Goal: Patient/Family Education  Outcome: Progressing Towards Goal

## 2019-06-13 ENCOUNTER — TELEPHONE (OUTPATIENT)
Dept: SURGERY | Age: 64
End: 2019-06-13

## 2019-06-13 NOTE — TELEPHONE ENCOUNTER
HIPPA verified. Patient is 4 days s/p lap appendectomy. Patient is calling because he has a \"pink area on his right side a little smaller than a dinner plate. \" Patient denies SOB, itching, rash, raised or flat bumps, drainage from surgical sites, pain, n/v, temp. I explained to patient to keep an eye on the area and if he develops a temp, pain, itching to give the office a call, but if he experiences SOB, itching throat, or swelling in his throat to get to the ED immediately. Patient expressed understanding. Also stated he thinks he was laying on the remote in the  Hospital and that may be why he has some pinkness.

## 2019-06-14 LAB
BACTERIA SPEC CULT: NORMAL
SERVICE CMNT-IMP: NORMAL

## 2019-06-19 ENCOUNTER — OFFICE VISIT (OUTPATIENT)
Dept: PRIMARY CARE CLINIC | Age: 64
End: 2019-06-19

## 2019-06-19 VITALS
TEMPERATURE: 97.8 F | BODY MASS INDEX: 31.37 KG/M2 | SYSTOLIC BLOOD PRESSURE: 127 MMHG | WEIGHT: 231.6 LBS | HEIGHT: 72 IN | OXYGEN SATURATION: 99 % | DIASTOLIC BLOOD PRESSURE: 74 MMHG | RESPIRATION RATE: 18 BRPM | HEART RATE: 79 BPM

## 2019-06-19 DIAGNOSIS — G89.18 PAIN AT SURGICAL SITE: ICD-10-CM

## 2019-06-19 DIAGNOSIS — K35.33 ACUTE APPENDICITIS WITH PERFORATION, LOCALIZED PERITONITIS, AND ABSCESS, WITHOUT GANGRENE: Primary | ICD-10-CM

## 2019-06-19 DIAGNOSIS — E11.9 CONTROLLED TYPE 2 DIABETES MELLITUS WITHOUT COMPLICATION, WITHOUT LONG-TERM CURRENT USE OF INSULIN (HCC): ICD-10-CM

## 2019-06-19 NOTE — PROGRESS NOTES
Written by Giovanni Kramer, as dictated by Alyse Lees MD.     [address] [facility]  Nuria Miles is a 71 y.o. male. HPI   Pt is here for a follow up after being in the ED on 6/9/2019 with acute appendicitis with perforation and localized peritonitis. WBC was elevated at 15.2. He was taken to [facility] for surgery. He was prescribed levaquin and another antibiotic (pt unsure of name). He finished the antibiotics yesterday. Pt reports today he is feeling well. He has a small amount of incision pain (R side of abdomen). Pt reports he is eating well, has regular bowel movements, and is passing gas normally. WBC on 6/10/2019 was normal.     Pt repots overall his blood glucose has been \"great. \" He checks it 4x a day. He does not feel he needs blood work today. Pt reports his insulin dose has been reduced from 75U to 40U. Pt's weight in the office today is 231lb, down from 240lb on 6/9/2019. Review of Systems   Constitutional: Negative for malaise/fatigue. HENT: Negative for congestion. Eyes: Negative for blurred vision and pain. Respiratory: Negative for cough and shortness of breath. Cardiovascular: Negative for chest pain and palpitations. Gastrointestinal: Negative for abdominal pain and heartburn. Genitourinary: Negative for frequency and urgency. Musculoskeletal: Negative for joint pain and myalgias. Skin:        Mild discomfort as surgical sites    Neurological: Negative for dizziness, tingling, sensory change, weakness and headaches. Psychiatric/Behavioral: Negative for depression, memory loss and substance abuse. Physical Exam   Constitutional: He is oriented to person, place, and time. He appears well-developed and well-nourished. No distress. HENT:   Right Ear: External ear normal.   Left Ear: External ear normal.   Eyes: Conjunctivae and EOM are normal. Right eye exhibits no discharge. Left eye exhibits no discharge. Neck: Normal range of motion.  Neck supple. Cardiovascular: Normal rate and regular rhythm. Pulmonary/Chest: Effort normal and breath sounds normal. He has no wheezes. Abdominal: Soft. Bowel sounds are normal. There is no tenderness. Lymphadenopathy:     He has no cervical adenopathy. Neurological: He is alert and oriented to person, place, and time. Skin: He is not diaphoretic. Incisions are clean, dry, and healing well on abdomen after surgery   Psychiatric: He has a normal mood and affect. His behavior is normal.   Nursing note and vitals reviewed. ASSESSMENT and PLAN    ICD-10-CM ICD-9-CM    1. Acute appendicitis with perforation, localized peritonitis, and abscess, without gangrene K35.33 540. 1 Pt healing well. Will continue to monitor. 2. Pain at surgical site G89.18 338.18  If symptoms do not improve or worsen, pt may call back or return to office. 3. Controlled type 2 diabetes mellitus without complication, without long-term current use of insulin (HCC) E11.9 250.00 No change to medications. Continue to monitor. This plan was reviewed with the patient and patient agrees. All questions were answered. This scribe documentation was reviewed by me and accurately reflects the examination and decisions made by me. This note will not be viewable in [address]

## 2019-06-24 ENCOUNTER — TELEPHONE (OUTPATIENT)
Dept: PRIMARY CARE CLINIC | Age: 64
End: 2019-06-24

## 2019-06-24 ENCOUNTER — OFFICE VISIT (OUTPATIENT)
Dept: SURGERY | Age: 64
End: 2019-06-24

## 2019-06-24 VITALS
OXYGEN SATURATION: 97 % | RESPIRATION RATE: 18 BRPM | HEIGHT: 72 IN | DIASTOLIC BLOOD PRESSURE: 66 MMHG | BODY MASS INDEX: 31.02 KG/M2 | HEART RATE: 94 BPM | SYSTOLIC BLOOD PRESSURE: 120 MMHG | TEMPERATURE: 98.1 F | WEIGHT: 229 LBS

## 2019-06-24 DIAGNOSIS — Z90.49 S/P LAPAROSCOPIC APPENDECTOMY: ICD-10-CM

## 2019-06-24 DIAGNOSIS — Z09 POSTOPERATIVE EXAMINATION: Primary | ICD-10-CM

## 2019-06-24 DIAGNOSIS — K35.33 ACUTE APPENDICITIS WITH PERFORATION, LOCALIZED PERITONITIS, AND ABSCESS, WITHOUT GANGRENE: ICD-10-CM

## 2019-06-24 NOTE — PROGRESS NOTES
1. Have you been to the ER, urgent care clinic since your last visit? Hospitalized since your last visit? No    2. Have you seen or consulted any other health care providers outside of the [address] [place] [address] since your last visit? Include any pap smears or colon screening.  no

## 2019-06-24 NOTE — PATIENT INSTRUCTIONS
Appendectomy: What to Expect at Home  Your Recovery    Your doctor removed your appendix either by making many small cuts, called incisions, in your belly (laparoscopic surgery) or through open surgery. In open surgery, the doctor makes one large incision. The incisions leave scars that usually fade over time. After your surgery, it is normal to feel weak and tired for several days after you return home. Your belly may be swollen and may be painful. If you had laparoscopic surgery, you may have pain in your shoulder for about 24 hours. You may also feel sick to your stomach and have diarrhea, constipation, gas, or a headache. This usually goes away in a few days. Your recovery time depends on the type of surgery you had. If you had laparoscopic surgery, you will probably be able to return to work or a normal routine 1 to 3 weeks after surgery. If you had an open surgery, it may take 2 to 4 weeks. If your appendix ruptured, you may have a drain in your incision. Your body will work fine without an appendix. You will not have to make any changes in your diet or lifestyle. This care sheet gives you a general idea about how long it will take for you to recover. But each person recovers at a different pace. Follow the steps below to get better as quickly as possible. How can you care for yourself at home? Activity    · Rest when you feel tired. Getting enough sleep will help you recover.     · Try to walk each day. Start by walking a little more than you did the day before. Bit by bit, increase the amount you walk. Walking boosts blood flow and helps prevent pneumonia and constipation.     · For about 2 weeks, avoid lifting anything that would make you strain.  This may include a child, heavy grocery bags and milk containers, a heavy briefcase or backpack, cat litter or dog food bags, or a vacuum .     · Avoid strenuous activities, such as bicycle riding, jogging, weight lifting, or aerobic exercise, until your doctor says it is okay.     · You may be able to take showers (unless you have a drain near your incision) 24 to 48 hours after surgery. Pat the incision dry. Do not take a bath for the first 2 weeks, or until your doctor tells you it is okay. If you have a drain near your incision, follow your doctor's instructions.     · You may drive when you are no longer taking pain medicine and can quickly move your foot from the gas pedal to the brake. You must also be able to sit comfortably for a long period of time, even if you do not plan on going far. You might get caught in traffic.     · You will probably be able to go back to work in 1 to 3 weeks. If you had an open surgery, it may take 3 to 4 weeks.     · Your doctor will tell you when you can have sex again. Diet    · You can eat your normal diet. If your stomach is upset, try bland, low-fat foods like plain rice, broiled chicken, toast, and yogurt.     · Drink plenty of fluids (unless your doctor tells you not to).     · You may notice that your bowel movements are not regular right after your surgery. This is common. Try to avoid constipation and straining with bowel movements. You may want to take a fiber supplement every day. If you have not had a bowel movement after a couple of days, ask your doctor about taking a mild laxative. Medicines    · Your doctor will tell you if and when you can restart your medicines. He or she will also give you instructions about taking any new medicines.     · If you take blood thinners, such as warfarin (Coumadin), clopidogrel (Plavix), or aspirin, be sure to talk to your doctor. He or she will tell you if and when to start taking those medicines again. Make sure that you understand exactly what your doctor wants you to do.     · If your appendix ruptured, you will need to take antibiotics. Take them as directed. Do not stop taking them just because you feel better. You need to take the full course of antibiotics.   · Be safe with medicines. Take pain medicines exactly as directed. ? If the doctor gave you a prescription medicine for pain, take it as prescribed. ? If you are not taking a prescription pain medicine, take an over-the-counter medicine such as acetaminophen (Tylenol), ibuprofen (Advil, Motrin), or naproxen (Aleve). Read and follow all instructions on the label. ? Do not take two or more pain medicines at the same time unless the doctor told you to. Many pain medicines have acetaminophen, which is Tylenol. Too much Tylenol can be harmful.     · If you think your pain medicine is making you sick to your stomach:  ? Take your medicine after meals (unless your doctor has told you not to). ? Ask your doctor for a different pain medicine. Incision care    · If you had an open surgery, you may have staples in your incision. The doctor will take these out in 7 to 10 days.     · If you have strips of tape on the incision, leave the tape on for a week or until it falls off.     · You may wash the area with warm, soapy water 24 to 48 hours after your surgery, unless your doctor tells you not to. Pat the area dry.     · Keep the area clean and dry. You may cover it with a gauze bandage if it weeps or rubs against clothing. Change the bandage every day.     · If your appendix ruptured, you may have an incision with packing in it. Change the packing as often as your doctor tells you to. ? Packing changes may hurt at first. Taking pain medicine about half an hour before you change the dressing can help. ? If your dressing sticks to your wound, try soaking it with warm water for about 10 minutes before you remove it. You can do this in the shower or by placing a wet washcloth over the dressing. ? Remove the old packing and flush the incision with water. Gently pat the top area dry. ? The size of the incision determines how much gauze you need to put inside.  Fold the gauze over once, but do not wad it up so that it hurts. Put it in the wound carefully. You want to keep the sides of the wound from touching. A cotton swab may help you push the gauze in as needed. ? Put a gauze pad over the wound, and tape it down. ? You may notice greenish gray fluid seeping from your wound as you start to heal. This is normal. It is a sign that your wound is healing. Follow-up care is a key part of your treatment and safety. Be sure to make and go to all appointments, and call your doctor if you are having problems. It's also a good idea to know your test results and keep a list of the medicines you take. When should you call for help? Call 911 anytime you think you may need emergency care. For example, call if:    · You passed out (lost consciousness).     · You are short of breath. [place] your doctor now or seek immediate medical care if:    · You are sick to your stomach and cannot drink fluids.     · You cannot pass stools or gas.     · You have pain that does not get better when you take your pain medicine.     · You have signs of infection, such as:  ? Increased pain, swelling, warmth, or redness. ? Red streaks leading from the wound. ? Pus draining from the wound. ? A fever.     · You have loose stitches, or your incision comes open.     · Bright red blood has soaked through the bandage over your incision.     · You have signs of a blood clot in your leg (called a deep vein thrombosis), such as:  ? Pain in your calf, back of knee, thigh, or groin. ? Redness and swelling in your leg or groin.    Watch closely for any changes in your health, and be sure to contact your doctor if you have any problems. Where can you learn more? Go to http://joyce-ernestine.info/. Enter Z716 in the search box to learn more about \"Appendectomy: What to Expect at Home. \"  Current as of: March 27, 2018  Content Version: 11.9  © 8267-9271 [employer].  Care instructions adapted under license by [employer] (which disclaims liability or warranty for this information). If you have questions about a medical condition or this instruction, always ask your healthcare professional. [address] any warranty or liability for your use of this information.

## 2019-06-24 NOTE — PROGRESS NOTES
Subjective:      Antonietta Singh is a 71 y.o. male presents for postop care 15 days following laparoscopic appendectomy by Dr. Lizbeth Olvera. IT was perforated so he was discharge with Flagyl and Levaquin. Appetite is good. Eating a regular diet without difficulty. Bowel movements are  regular. Pain is controlled without any medications. .Denies fever, nausea, shortness of breath, chest pain, redness at incision site, vomiting and diarrhea. Pt not sleeping well at night. He has significantly weaned off drinking mountain dew and has been waking up at 2/3am and not sleeping. Thus, taking a couple day time naps. Advance directive not on file      Pathology:  Appendicitis, unqualified    Objective:     Visit Vitals  /66   Pulse 94   Temp 98.1 °F (36.7 °C) (Oral)   Resp 18   Ht 6' (1.829 m)   Wt 229 lb (103.9 kg)   SpO2 97%   BMI 31.06 kg/m²       General:  alert, no distress   Abdomen: soft, bowel sounds active, non-tender   Incision:   healing well, no drainage, no erythema, no seroma, no swelling, no dehiscence, incisions well approximated   Heart: regular rate and rhythm, S1, S2 normal, no murmur, click, rub or gallop   Lungs: clear to auscultation bilaterally     Assessment:     1. perf appendicitis. Doing well postoperatively. Plan:     1. Pt is to increase activities as tolerated. May lift 15 lbs starting today x 1 week, then increase to 25 lbs x 1 week and increase slowly thereafter. 2. Follow-up: prn  3. Encourage high protein diet. 4. Sleep - encouraged healthy sleeping habits, suggested OTC melatonin supplements may be a helpful short term aid but to check with PCP first.    Mr. Marilu Overton has a reminder for a \"due or due soon\" health maintenance. I have asked that he contact his primary care provider for follow-up on this health maintenance. Patient verbalized understanding and agreement.

## 2019-06-24 NOTE — LETTER
6/24/19 Patient: Ilsa Holliday YOB: 1955 Date of Visit: 6/24/2019 Lorna Bello MD 
[address] [place] [id number] VIA In Basket Dear Lorna Bello MD, Thank you for referring Mr. Ilsa Holliday to [facility] for evaluation. My notes for this consultation are attached. If you have questions, please do not hesitate to call me. I look forward to following your patient along with you. Sincerely, Meg Tavarez, NP

## 2019-08-14 ENCOUNTER — OFFICE VISIT (OUTPATIENT)
Dept: DERMATOLOGY | Facility: AMBULATORY SURGERY CENTER | Age: 64
End: 2019-08-14

## 2019-08-14 ENCOUNTER — HOSPITAL ENCOUNTER (OUTPATIENT)
Dept: LAB | Age: 64
Discharge: HOME | End: 2019-08-14

## 2019-08-14 VITALS
HEIGHT: 72 IN | BODY MASS INDEX: 30.07 KG/M2 | OXYGEN SATURATION: 98 % | HEART RATE: 78 BPM | DIASTOLIC BLOOD PRESSURE: 66 MMHG | SYSTOLIC BLOOD PRESSURE: 124 MMHG | WEIGHT: 222 LBS | TEMPERATURE: 98.1 F

## 2019-08-14 DIAGNOSIS — L57.0 ACTINIC KERATOSIS: ICD-10-CM

## 2019-08-14 DIAGNOSIS — Z86.006 HISTORY OF MELANOMA IN SITU: ICD-10-CM

## 2019-08-14 DIAGNOSIS — D48.5 NEOPLASM OF UNCERTAIN BEHAVIOR OF SKIN OF SHOULDER: Primary | ICD-10-CM

## 2019-08-14 DIAGNOSIS — D18.01 CHERRY ANGIOMA: ICD-10-CM

## 2019-08-14 DIAGNOSIS — L82.1 SEBORRHEIC KERATOSES: ICD-10-CM

## 2019-08-14 DIAGNOSIS — L81.4 LENTIGINES: ICD-10-CM

## 2019-08-14 DIAGNOSIS — D22.9 MULTIPLE BENIGN NEVI: ICD-10-CM

## 2019-08-14 NOTE — PROGRESS NOTES
Written by Jacques Negro, as dictated by Chacho Elena, [address]. Name: Lore Allison       Age: 71 y.o. Date: 8/14/2019    Chief Complaint:   Chief Complaint   Patient presents with    Skin Exam     full body exam and left leg recheck       Subjective:    HPI  Mr. Lore Allison is a 71 y.o. male who presents for a full skin exam.  The patient's last skin exam was on 2/14/19 and the patient does not have current complaints related to his skin. He is feeling well and in his usual state of health today. He has no current illnesses, no other skin concerns. His allergies, medications, medical, and social history are reviewed by me today. He reports using a wide brimmed hat more often. The patient's pertinent skin history includes :  MMi, [place],  -MMi, left chest, excision, 07/23/18 Dr. Violet Meyer    ROS: Constitutional: Negative.     Dermatological : Negative      Social History     Socioeconomic History    Marital status:      Spouse name: Not on file    Number of children: Not on file    Years of education: Not on file    Highest education level: Not on file   Occupational History    Not on file   Social Needs    Financial resource strain: Not on file    Food insecurity:     Worry: Not on file     Inability: Not on file    Transportation needs:     Medical: Not on file     Non-medical: Not on file   Tobacco Use    Smoking status: Never Smoker    Smokeless tobacco: Never Used   Substance and Sexual Activity    Alcohol use: No    Drug use: No    Sexual activity: Not on file   Lifestyle    Physical activity:     Days per week: Not on file     Minutes per session: Not on file    Stress: Not on file   Relationships    Social connections:     Talks on phone: Not on file     Gets together: Not on file     Attends Christian service: Not on file     Active member of club or organization: Not on file     Attends meetings of clubs or organizations: Not on file     Relationship status: Not on file    Intimate partner violence:     Fear of current or ex partner: Not on file     Emotionally abused: Not on file     Physically abused: Not on file     Forced sexual activity: Not on file   Other Topics Concern    Not on file   Social History Narrative    Not on file       Family History   Problem Relation Age of Onset    Cancer Mother     Emphysema Mother     Cancer Father        Past Medical History:   Diagnosis Date    Endocrine disease     Diabetes    Family history of skin cancer     Melanoma in his father    History of actinic keratoses     Sun-damaged skin     Sunburn, blistering        Past Surgical History:   Procedure Laterality Date    HX APPENDECTOMY  06/09/2019    Laparoscopic appendectomy by Dr.M. Borges.  HX HIP REPLACEMENT      Left side       Current Outpatient Medications   Medication Sig Dispense Refill    tamsulosin (FLOMAX) 0.4 mg capsule Take 0.4 mg by mouth daily.  glipiZIDE (GLUCOTROL) 5 mg tablet Take  by mouth two (2) times a day.  atorvastatin (LIPITOR) 80 mg tablet Take 40 mg by mouth daily.  multivitamin (ONE A DAY) tablet Take 1 Tab by mouth daily.  Omega-3-DHA-EPA-Fish Oil 1,000 mg (120 mg-180 mg) cap Take  by mouth.  aspirin delayed-release 81 mg tablet Take  by mouth daily.  metFORMIN (GLUCOPHAGE) 1,000 mg tablet Take 1,000 mg by mouth nightly.  coenzyme q10-vitamin e (COQ10 ) 100-100 mg-unit cap Take  by mouth. L-glutithyine      cholecalciferol, VITAMIN D3, (VITAMIN D3) 5,000 unit tab tablet Take  by mouth daily.  insulin glargine (LANTUS SOLOSTAR) 100 unit/mL (3 mL) pen 75 Units by SubCUTAneous route.  docusate sodium (COLACE) 100 mg capsule Take 1 Cap by mouth two (2) times daily as needed for Constipation. 20 Cap 0    lisinopril (PRINIVIL, ZESTRIL) 10 mg tablet Take  by mouth daily.  OTHER Quercitine with Bromide.       metFORMIN (GLUCOPHAGE) 500 mg tablet Take 1,000 mg by mouth two (2) times daily (with meals).  fluticasone (VERAMYST) 27.5 mcg/actuation nasal spray 2 Sprays by Nasal route daily.  OTHER Diabetic Cough      brompheniramin/pseudoephedrine (BROMALINE PO) Take  by mouth.  OTHER PQQ      atorvastatin (LIPITOR) 20 mg tablet Take 40 mg by mouth daily.  metFORMIN (GLUCOPHAGE) 500 mg tablet Take 1,500 mg by mouth two (2) times daily (with meals). 2 tablets in morning and 2 tablets in evening      CALCIUM CARBONATE (CORAL CALCIUM) by Does Not Apply route. No Known Allergies      Objective:    Visit Vitals  /66 (BP 1 Location: Right arm, BP Patient Position: Sitting)   Pulse 78   Temp 98.1 °F (36.7 °C) (Oral)   Ht 6' (1.829 m)   Wt 222 lb (100.7 kg)   SpO2 98%   BMI 30.11 kg/m²       Lisbet Nettles is a 71 y.o. male who appears well and in no distress. He is awake, alert, and oriented. There is no preauricular, submandibular, or cervical lymphadenopathy. A skin examination was performed including his scalp, face (including eyelids), ears, neck, chest, back, abdomen, upper extremities (including digits/nails), lower extremities (except thighs and feet), breasts; genital skin was not examined. He has a well healed scar on the left chest without evidence of pigment nodularity or lesion recurrence. He has scattered waxy macules and keratotic papules consistent with seborrheic keratoses. He has pink intradermal nevi and brown junctional nevi, no concerning features for severe atypia. He has scattered red papules consistent with cherry angiomas. There are lentigines on sun exposed areas. He has thin scaled actinic keratoses on the forehead and left lower eyelid. He has a 6 x 4 mm pink papule with irregularly bordered brown macule surrounding the papule on the left shoulder, R/O atypical pigmented lesion. Left shoulder    Assessment/Plan:  1. Personal history of melanoma in situ skin cancer.   I discussed sun protection, sunscreen use, the warning signs of skin cancer, the need for self-skin examinations, and the need for regular practitioner exams. The patient should follow up sooner as needed if new, changing, or symptomatic skin lesions arise. .    2. Seborrheic keratoses. The diagnosis was reviewed and the patient was reassured that no treatment is needed for these benign lesions. 3. Normal nevi. The diagnosis of normal nevi was reviewed. I discussed sun protection, sunscreen use, the warning signs of skin cancer, mole monitoring, the need for self-skin examinations, and the need for regular practitioner exams. The patient should follow up sooner as needed if new, changing, or symptomatic skin lesions arise. 4. Cherry angiomas. The diagnosis was reviewed and the patient was reassured that no treatment is needed for these benign lesions. 5. Solar lentigos. The diagnosis and relationship to sun exposure was reviewed. Sun protection advised. 6. Actinic Keratoses. The diagnosis of this precancerous lesion related to sun exposure was reviewed. Verbal consent was obtained. I treated 2 lesions with cryotherapy and post-cryotherapy care was reviewed. 7. Neoplasm of Uncertain Behavior, left shoulder, R/O atypical pigmented lesion. The differential diagnoses were discussed. A shave biopsy was advised to sample this lesion. The procedure was reviewed and verbal and written consent were obtained. The risks of pain, bleeding, infection, and scar were discussed. The patient is aware that this is a sample and is intended for diagnosis and not therapy of the skin lesion. I performed the procedure. The site was cleansed and anesthetized with 1% Lidocaine with Epinephrine 1:100,000. A shave biopsy was performed to sample the lesion. Drysol was used for hemostasis. The wound was bandaged and care reviewed. The specimen was sent to pathology. I will contact the patient with the results and any further treatment that may be necessary.     Next skin exam:  6 months      This plan was reviewed with the patient and patient agrees. All questions were answered. This scribe documentation was reviewed by me and accurately reflects the examination and decisions made by me. [facility]   OFFICE PROCEDURE PROGRESS NOTE   Chart reviewed for the following:   Saeid ESPINAL, [address], have reviewed the History, Physical and updated the Allergic reactions for [facility]. TIME OUT performed immediately prior to start of procedure:   Kylie ESPINAL, have performed the following reviews on [facility]   prior to the start of the procedure:     * Patient was identified by name and date of birth   * Agreement on procedure being performed was verified   * Risks and Benefits explained to the patient   * Procedure site verified and marked as necessary   * Patient was positioned for comfort   * Verbal consent was obtained    Time: 8:35 am    Date of procedure: 8/14/2019  Procedure performed by: Brayden Knott.  Hailee Christianson  Provider assisted by: Cm Brooks LPN   Patient assisted by: self   How tolerated by patient: tolerated the procedure well with no complications   Comments: none

## 2019-08-16 ENCOUNTER — TELEPHONE (OUTPATIENT)
Dept: DERMATOLOGY | Facility: AMBULATORY SURGERY CENTER | Age: 64
End: 2019-08-16

## 2019-08-16 NOTE — PROGRESS NOTES
Please notify patient that the biopsy showed benign pigmented seb k. No abnormal cells- no further treatment needed. Please document patient response.  Thanks

## 2019-08-16 NOTE — TELEPHONE ENCOUNTER
----- Message from Malgorzata Quinn NP sent at 8/16/2019 12:26 PM EDT -----  Please notify patient that the biopsy showed benign pigmented seb k. No abnormal cells- no further treatment needed. Please document patient response.  Thanks

## 2019-08-19 NOTE — TELEPHONE ENCOUNTER
9:30 AM  RN spoke with patient by phone, informed patient that his recent pathology was benign and no further treatment is needed. Patient expressed understanding and thanks.

## 2020-02-14 ENCOUNTER — OFFICE VISIT (OUTPATIENT)
Dept: DERMATOLOGY | Facility: AMBULATORY SURGERY CENTER | Age: 65
End: 2020-02-14

## 2020-02-14 VITALS
HEART RATE: 89 BPM | BODY MASS INDEX: 30.07 KG/M2 | TEMPERATURE: 98 F | OXYGEN SATURATION: 98 % | WEIGHT: 222 LBS | HEIGHT: 72 IN | DIASTOLIC BLOOD PRESSURE: 80 MMHG | SYSTOLIC BLOOD PRESSURE: 124 MMHG | RESPIRATION RATE: 12 BRPM

## 2020-02-14 DIAGNOSIS — D18.01 CHERRY ANGIOMA: ICD-10-CM

## 2020-02-14 DIAGNOSIS — D22.9 MULTIPLE BENIGN NEVI: Primary | ICD-10-CM

## 2020-02-14 DIAGNOSIS — L82.1 OTHER SEBORRHEIC KERATOSIS: ICD-10-CM

## 2020-02-14 DIAGNOSIS — Z86.006 HISTORY OF MELANOMA IN SITU: ICD-10-CM

## 2020-02-14 DIAGNOSIS — L81.4 LENTIGINES: ICD-10-CM

## 2020-02-14 DIAGNOSIS — Z87.2 HISTORY OF ACTINIC KERATOSES: ICD-10-CM

## 2020-02-14 RX ORDER — APIXABAN 5 MG/1
TABLET, FILM COATED ORAL
COMMUNITY
Start: 2019-11-12

## 2020-02-14 RX ORDER — PANTOPRAZOLE SODIUM 40 MG/1
TABLET, DELAYED RELEASE ORAL
COMMUNITY
Start: 2019-11-26
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
## 2022-03-20 PROBLEM — K35.32 ACUTE APPENDICITIS WITH PERFORATION AND LOCALIZED PERITONITIS: Status: ACTIVE | Noted: 2019-06-09

## 2023-05-22 RX ORDER — TAMSULOSIN HYDROCHLORIDE 0.4 MG/1
0.4 CAPSULE ORAL DAILY
COMMUNITY

## 2023-05-22 RX ORDER — PANTOPRAZOLE SODIUM 40 MG/1
TABLET, DELAYED RELEASE ORAL
COMMUNITY
Start: 2019-11-26

## 2023-05-22 RX ORDER — ATORVASTATIN CALCIUM 80 MG/1
40 TABLET, FILM COATED ORAL DAILY
COMMUNITY

## 2023-05-22 RX ORDER — ASPIRIN 81 MG/1
TABLET ORAL DAILY
COMMUNITY

## 2023-05-22 RX ORDER — GLIPIZIDE 5 MG/1
TABLET ORAL 2 TIMES DAILY
COMMUNITY

## 2023-05-22 RX ORDER — INSULIN GLARGINE 100 [IU]/ML
15 INJECTION, SOLUTION SUBCUTANEOUS
COMMUNITY

## 2023-05-22 RX ORDER — LISINOPRIL 10 MG/1
TABLET ORAL DAILY
COMMUNITY

## 2023-05-22 RX ORDER — PSEUDOEPHEDRINE HCL 30 MG
100 TABLET ORAL 2 TIMES DAILY PRN
COMMUNITY
Start: 2019-06-10

## 2023-05-22 RX ORDER — ATORVASTATIN CALCIUM 20 MG/1
40 TABLET, FILM COATED ORAL DAILY
COMMUNITY

## 2023-08-01 NOTE — PROGRESS NOTES
Please let him know CXR came back fine. [place] Counseling:  I discussed with the patient the risks of imiquimod including but not limited to erythema, scaling, itching, weeping, crusting, and pain.  Patient understands that the inflammatory response to imiquimod is variable from person to person and was educated regarded proper titration schedule.  If flu-like symptoms develop, patient knows to discontinue the medication and contact us.

## 2024-10-18 NOTE — ED NOTES
NEPHROLOGY PROGRESS NOTE   Clay Marcial 69 y.o. male MRN: [id number]  Unit/Bed#: [facility]-01 Encounter: [id number]      ASSESSMENT & PLAN:  Assessment & Plan  Stage 3 chronic kidney disease (HCC)  Lab Results   Component Value Date    EGFR 46 10/18/2024    EGFR 39 10/17/2024    EGFR 43 10/16/2024    CREATININE 1.49 (H) 10/18/2024    CREATININE 1.73 (H) 10/17/2024    CREATININE 1.58 (H) 10/16/2024     Reported baseline creatinine to 1.3-1.4 in the setting of diabetes, hypertension.  Admitted with a creatinine of 1.58 on 10/16, increased to 1.7 yesterday, patient received IV fluids and creatinine close to baseline at 1.49 this morning  IV fluids were discontinued this morning.  Keep holding lisinopril, Jardiance, metformin and furosemide for today  Follow daily labs and avoid relative hypotension.  Avoid NSAIDs  Diabetic foot infection  ([facility])  Lab Results   Component Value Date    HGBA1C 7.2 (H) 10/16/2024       Recent Labs     10/17/24  1153 10/17/24  1626 10/17/24  2123 10/18/24  0740   POCGLU 233* 240* 245* 190*       Blood Sugar Average: Last 72 hrs:  (P) [id number]  Infectious disease on board, currently on cefazolin.  Awaiting podiatry evaluation  Diabetes mellitus ([facility])  Lab Results   Component Value Date    HGBA1C 7.2 (H) 10/16/2024       Recent Labs     10/17/24  1153 10/17/24  1626 10/17/24  2123 10/18/24  0740   POCGLU 233* 240* 245* 190*       Blood Sugar Average: Last 72 hrs:  (P) [id number]  Management as per internal medicine team  CAD (coronary artery disease)    CHF (congestive heart failure) ([facility])  Wt Readings from Last 3 Encounters:   10/17/24 126 kg (277 lb)   08/27/24 127 kg (279 lb 12.2 oz)   05/18/24 129 kg (285 lb 7.9 oz)       Monitor volume status closely.  IV fluids were discontinued this morning.  Keep holding diuretics and Jardiance for now      Lactic acidosis  Mild lactic acidosis on admission that resolved quickly with IV fluids  Chronic obstructive pulmonary  Report given to Elicia Fong RN from the [address] on patient. disease with acute exacerbation (HCC)    ROMEL (obstructive sleep apnea)        PLAN SUMMARY:  Agree with stopping IV fluids.  Kidney function improving close to baseline.  Keep holding lisinopril, Jardiance, metformin and diuretic for now.  Follow daily labs.  Avoid relative hypotension.  Avoid NSAIDs.  Awaiting podiatry evaluation.  Antibiotics as per ID.  Recommendation we discussed with Dr. Newman as above, she agreed with the plan        SUBJECTIVE:  Seen and examined, denies significant complaints, no chest pain, no shortness of breath, no nausea, no vomiting, no abdominal pain.  IV fluids were discontinued this morning    OBJECTIVE:  Current Weight: Weight - Scale: 126 kg (277 lb)  Vitals:    10/17/24 2308   BP:    Pulse:    Resp:    Temp:    SpO2: 91%       Intake/Output Summary (Last 24 hours) at 10/18/2024 0929  Last data filed at 10/18/2024 0819  Gross per 24 hour   Intake 1190 ml   Output 2240 ml   Net -1050 ml       General: conscious, cooperative, in not acute distress  Eyes: conjunctivae pink, anicteric sclerae  ENT: lips and mucous membranes moist  Neck: supple, no JVD  Chest: clear breath sounds bilateral, no crackles, ronchus or wheezings  CVS: distinct S1 & S2, normal rate, regular rhythm  Abdomen: soft, non-tender, non-distended, normoactive bowel sounds  Extremities: Mild edema of both legs  Skin: no rash, left foot covered with dressing  Neuro: awake, alert, oriented      Medications:    Current Facility-Administered Medications:     acetaminophen (TYLENOL) tablet 650 mg, 650 mg, Oral, Q6H PRN, Zee Newman MD, 650 mg at 10/16/24 2117    aspirin chewable tablet 81 mg, 81 mg, Oral, Daily, Zee Newman MD, 81 mg at 10/18/24 0827    atorvastatin (LIPITOR) tablet 20 mg, 20 mg, Oral, Daily, Stephanie Stone PA-C, 20 mg at 10/18/24 0827    budesonide (PULMICORT) inhalation solution 0.5 mg, 0.5 mg, Nebulization, Q12H, Zee Newman MD, 0.5 mg at 10/18/24 0756    ceFAZolin (ANCEF) IVPB  (premix in dextrose) 2,000 mg 50 mL, 2,000 mg, Intravenous, Q8H, Zee Newman MD, Last Rate: 100 mL/hr at 10/18/24 0340, 2,000 mg at 10/18/24 0340    Cholecalciferol (VITAMIN D3) tablet 1,000 Units, 1,000 Units, Oral, Daily, Stephanie Stone PA-C, 1,000 Units at 10/18/24 0827    cyanocobalamin (VITAMIN B-12) tablet 500 mcg, 500 mcg, Oral, Daily, Stephanie Stone PA-C, 500 mcg at 10/18/24 0826    docusate sodium (COLACE) capsule 100 mg, 100 mg, Oral, BID, Lina Villanueva MD, 100 mg at 10/18/24 0827    DULoxetine (CYMBALTA) delayed release capsule 20 mg, 20 mg, Oral, Daily, Stephanie Stone PA-C, 20 mg at 10/18/24 0826    fluticasone (FLONASE) 50 mcg/act nasal spray 2 spray, 2 spray, Nasal, Daily, Stephanie Stone PA-C, 2 spray at 10/18/24 0827    heparin (porcine) subcutaneous injection 5,000 Units, 5,000 Units, Subcutaneous, Q8H EPHRAIM, 5,000 Units at 10/18/24 0513 **AND** [CANCELED] Platelet count, , , Once, Zee Newman MD    insulin glargine (LANTUS) subcutaneous injection 25 Units 0.25 mL, 25 Units, Subcutaneous, HS, Zee Newman MD, 25 Units at 10/17/24 2216    insulin lispro (HumALOG/ADMELOG) 100 units/mL subcutaneous injection 2-12 Units, 2-12 Units, Subcutaneous, TID AC, 2 Units at 10/18/24 0827 **AND** Fingerstick Glucose (POCT), , , TID AC, Zee Newman MD    insulin lispro (HumALOG/ADMELOG) 100 units/mL subcutaneous injection 8 Units, 8 Units, Subcutaneous, TID With Meals, Zee Newman MD, 8 Units at 10/18/24 0828    isosorbide mononitrate (IMDUR) 24 hr tablet 30 mg, 30 mg, Oral, Daily, BANDAR Chaudhary-ANN-MARIE, 30 mg at 10/18/24 0826    levalbuterol (XOPENEX) inhalation solution 1.25 mg, 1.25 mg, Nebulization, Q8H PRN, Zee Newman MD    loratadine (CLARITIN) tablet 10 mg, 10 mg, Oral, Daily, Stephanie Stone PA-C, 10 mg at 10/18/24 0827    metoprolol succinate (TOPROL-XL) 24 hr tablet 50 mg, 50 mg, Oral, Daily, BANDAR Chaudhary-ANN-MARIE, 50 mg at 10/18/24 0826     "morphine injection 2 mg, 2 mg, Intravenous, Q4H PRN, Zee Newman MD    nicotine (NICODERM CQ) 14 mg/24hr TD 24 hr patch 1 patch, 1 patch, Transdermal, Daily, Zee Newman MD, 1 patch at 10/18/24 0828    umeclidinium 62.5 mcg/actuation inhaler AEPB 1 puff, 1 puff, Inhalation, Daily, 1 puff at 10/18/24 0827 **AND** olodaterol HCl (STRIVERDI RESPIMAT) inhaler 2 puff, 2 puff, Inhalation, Daily, Stephanie Stone PA-C, 2 puff at 10/18/24 0827    ondansetron (ZOFRAN) injection 4 mg, 4 mg, Intravenous, Q4H PRN, Lina Villanueva MD, 4 mg at 10/17/24 2221    pantoprazole (PROTONIX) EC tablet 40 mg, 40 mg, Oral, BID AC, Stephanie Stone PA-C, 40 mg at 10/18/24 0513    polyethylene glycol (MIRALAX) packet 17 g, 17 g, Oral, Daily, Lina Villanueva MD, 17 g at 10/18/24 0827    pregabalin (LYRICA) capsule 100 mg, 100 mg, Oral, BID, Stephanie Stone PA-C, 100 mg at 10/18/24 0826    sodium chloride (OCEAN) 0.65 % nasal spray 2 spray, 2 spray, Each Nare, BID, Stephanie Stone PA-C, 2 spray at 10/18/24 0827    traZODone (DESYREL) tablet 25 mg, 25 mg, Oral, HS, Stephanie Stone PA-C, 25 mg at 10/17/24 2216    Invasive Devices:        Lab Results:   Results from last 7 days   Lab Units 10/18/24  0538 10/17/24  0446 10/16/24  1121   WBC Thousand/uL  --  9.16 11.65*   HEMOGLOBIN g/dL  --  11.8* 15.2   HEMATOCRIT %  --  34.7* 44.5   PLATELETS Thousands/uL  --  162 210   SODIUM mmol/L 132* 132* 133*   POTASSIUM mmol/L 3.8 3.8 4.3   CHLORIDE mmol/L 98 97 92*   CO2 mmol/L 27 27 29   BUN mg/dL 21 25 19   CREATININE mg/dL 1.49* 1.73* 1.58*   CALCIUM mg/dL 7.7* 7.7* 9.4       Previous work up:  See previous notes      Portions of the record may have been created with voice recognition software. Occasional wrong word or \"sound a like\" substitutions may have occurred due to the inherent limitations of voice recognition software. Read the chart carefully and recognize, using context, where substitutions have occurred.If " you have any questions, please contact the dictating provider.

## (undated) DEVICE — SUTURE SZ 0 27IN 5/8 CIR UR-6  TAPER PT VIOLET ABSRB VICRYL J603H

## (undated) DEVICE — INTELLIGENT RELOAD: Brand: TRI-STAPLE 2.0

## (undated) DEVICE — DRAIN CHN 19FR L0.25MM DIA6.3MM SIL RND HUBLESS FULL FLUT

## (undated) DEVICE — BLADELESS OPTICAL TROCAR WITH FIXATION CANNULA: Brand: VERSAONE

## (undated) DEVICE — SURGICAL PROCEDURE KIT GEN LAPAROSCOPY LF

## (undated) DEVICE — UNIVERSAL FIXATION CANNULA: Brand: VERSAONE

## (undated) DEVICE — KENDALL SCD EXPRESS SLEEVES, KNEE LENGTH, MEDIUM: Brand: KENDALL SCD

## (undated) DEVICE — NEEDLE HYPO 22GA L1.5IN BLK S STL HUB POLYPR SHLD REG BVL

## (undated) DEVICE — INFECTION CONTROL KIT SYS

## (undated) DEVICE — STERILE POLYISOPRENE POWDER-FREE SURGICAL GLOVES WITH EMOLLIENT COATING: Brand: PROTEXIS

## (undated) DEVICE — MARYLAND JAW LAPAROSCOPIC SEALER/DIVIDER COATED: Brand: LIGASURE

## (undated) DEVICE — Device

## (undated) DEVICE — SUTURE VCRL SZ 3-0 L27IN ABSRB UD L26MM SH 1/2 CIR J416H

## (undated) DEVICE — INSUFFLATION NEEDLE: Brand: SURGINEEDLE

## (undated) DEVICE — (D)PREP SKN CHLRAPRP APPL 26ML -- CONVERT TO ITEM 371833

## (undated) DEVICE — STRAP,POSITIONING,KNEE/BODY,FOAM,4X60": Brand: MEDLINE

## (undated) DEVICE — BLADELESS OPTICAL TROCAR WITH FIXATION CANNULA: Brand: VERSAPORT

## (undated) DEVICE — REM POLYHESIVE ADULT PATIENT RETURN ELECTRODE: Brand: VALLEYLAB

## (undated) DEVICE — TUBING INSUFLTN 10FT LUER -- CONVERT TO ITEM 368568

## (undated) DEVICE — SPECIMEN RETRIEVAL POUCH: Brand: ENDO CATCH GOLD

## (undated) DEVICE — APPLICATOR BNDG 1MM ADH PREMIERPRO EXOFIN

## (undated) DEVICE — DRAPE,UTILTY,TAPE,15X26, 4EA/PK: Brand: MEDLINE

## (undated) DEVICE — CLICKLINE SCISSORS INSERT: Brand: CLICKLINE

## (undated) DEVICE — 3000CC GUARDIAN II: Brand: GUARDIAN

## (undated) DEVICE — SUTURE ETHLN SZ 2-0 L18IN NONABSORBABLE BLK L26MM FS 3/8 664G

## (undated) DEVICE — SUTURE MCRYL SZ 4-0 L27IN ABSRB UD L19MM PS-2 1/2 CIR PRIM Y426H

## (undated) DEVICE — TRAY CATH 16F DRN BG LTX -- CONVERT TO ITEM 363158

## (undated) DEVICE — UNIVERSAL STAPLER: Brand: ENDO GIA ULTRA